# Patient Record
Sex: MALE | Race: WHITE | NOT HISPANIC OR LATINO | Employment: UNEMPLOYED | URBAN - METROPOLITAN AREA
[De-identification: names, ages, dates, MRNs, and addresses within clinical notes are randomized per-mention and may not be internally consistent; named-entity substitution may affect disease eponyms.]

---

## 2019-10-16 ENCOUNTER — OFFICE VISIT (OUTPATIENT)
Dept: FAMILY MEDICINE | Facility: CLINIC | Age: 54
End: 2019-10-16
Payer: MEDICAID

## 2019-10-16 VITALS
RESPIRATION RATE: 20 BRPM | HEIGHT: 71 IN | BODY MASS INDEX: 39.2 KG/M2 | SYSTOLIC BLOOD PRESSURE: 124 MMHG | OXYGEN SATURATION: 97 % | DIASTOLIC BLOOD PRESSURE: 80 MMHG | HEART RATE: 88 BPM | WEIGHT: 280 LBS

## 2019-10-16 DIAGNOSIS — R19.01 ABDOMINAL MASS, RIGHT UPPER QUADRANT: Primary | ICD-10-CM

## 2019-10-16 DIAGNOSIS — F41.9 ANXIETY: ICD-10-CM

## 2019-10-16 DIAGNOSIS — G47.30 SLEEP APNEA, UNSPECIFIED TYPE: ICD-10-CM

## 2019-10-16 DIAGNOSIS — F32.A DEPRESSION, UNSPECIFIED DEPRESSION TYPE: ICD-10-CM

## 2019-10-16 DIAGNOSIS — Z76.89 ENCOUNTER TO ESTABLISH CARE: ICD-10-CM

## 2019-10-16 DIAGNOSIS — F31.9 BIPOLAR 1 DISORDER: ICD-10-CM

## 2019-10-16 DIAGNOSIS — M50.20 SLIPPED DISC IN NECK: ICD-10-CM

## 2019-10-16 DIAGNOSIS — G47.00 INSOMNIA, UNSPECIFIED TYPE: ICD-10-CM

## 2019-10-16 PROCEDURE — 90471 IMMUNIZATION ADMIN: CPT | Mod: S$GLB,,, | Performed by: NURSE PRACTITIONER

## 2019-10-16 PROCEDURE — 90686 PR FLU VACCINE, QIIV4, NO PRSV, 0.5 ML, IM: ICD-10-PCS | Mod: S$GLB,,, | Performed by: NURSE PRACTITIONER

## 2019-10-16 PROCEDURE — 99205 OFFICE O/P NEW HI 60 MIN: CPT | Mod: 25,S$GLB,, | Performed by: NURSE PRACTITIONER

## 2019-10-16 PROCEDURE — 90686 IIV4 VACC NO PRSV 0.5 ML IM: CPT | Mod: S$GLB,,, | Performed by: NURSE PRACTITIONER

## 2019-10-16 PROCEDURE — 99205 PR OFFICE/OUTPT VISIT, NEW, LEVL V, 60-74 MIN: ICD-10-PCS | Mod: 25,S$GLB,, | Performed by: NURSE PRACTITIONER

## 2019-10-16 PROCEDURE — 90471 PR IMMUNIZ ADMIN,1 SINGLE/COMB VAC/TOXOID: ICD-10-PCS | Mod: S$GLB,,, | Performed by: NURSE PRACTITIONER

## 2019-10-16 RX ORDER — DULOXETIN HYDROCHLORIDE 60 MG/1
60 CAPSULE, DELAYED RELEASE ORAL NIGHTLY
COMMUNITY
Start: 2019-10-15 | End: 2020-02-28 | Stop reason: SDUPTHER

## 2019-10-16 RX ORDER — TIZANIDINE 4 MG/1
TABLET ORAL
Refills: 3 | COMMUNITY
Start: 2019-09-17 | End: 2020-09-21

## 2019-10-16 RX ORDER — TAMSULOSIN HYDROCHLORIDE 0.4 MG/1
CAPSULE ORAL
Refills: 3 | COMMUNITY
Start: 2019-10-02 | End: 2022-03-01 | Stop reason: SDUPTHER

## 2019-10-16 RX ORDER — FINASTERIDE 5 MG/1
5 TABLET, FILM COATED ORAL DAILY
Refills: 3 | COMMUNITY
Start: 2019-10-02 | End: 2022-03-01 | Stop reason: SDUPTHER

## 2019-10-16 RX ORDER — CYANOCOBALAMIN (VITAMIN B-12) 500 MCG
TABLET ORAL
COMMUNITY
End: 2022-07-25 | Stop reason: ALTCHOICE

## 2019-10-16 RX ORDER — LATANOPROST 50 UG/ML
SOLUTION/ DROPS OPHTHALMIC
Refills: 2 | COMMUNITY
Start: 2019-10-01 | End: 2020-09-21

## 2019-10-16 RX ORDER — GABAPENTIN 600 MG/1
600 TABLET ORAL 3 TIMES DAILY
Refills: 6 | COMMUNITY
Start: 2019-09-21

## 2019-10-16 RX ORDER — ARIPIPRAZOLE 5 MG/1
TABLET ORAL
COMMUNITY
Start: 2019-08-22 | End: 2019-12-11 | Stop reason: ALTCHOICE

## 2019-10-16 NOTE — PROGRESS NOTES
Clinic Note  10/16/2019      Subjective:       Patient ID:  BEATRIZ is a 54 y.o. male being seen in office today to establish care.       Chief Complaint: Establish Care (Prior PCP Neftaly Stokes) and Abdominal Pain (Upper R quad for a few mnths - has a knot x 8 yrs. States he was taken off of Lovastin 3 mn ths ago d/t something on kidney)    Pt presents to clinic to establish care. Previously seen by Dr. Stokes at [facility] but pt has a hard time with transportation to [place] and would like to be seen locally. Reports mass to right upper abdomen x several months that is non-tender. Denies injury.  States Dr. Stokes took him off of a statin due to elevated liver enzymes in the past. Will request old labs and records.     Hx of Bipolar Disorder, Depression, and Anxiety who are all managed my [facility]. Pt states he is well controlled on current regimen of Abilify and Cymbalta.    Hx of elevated PSA and Prostatitis-managed by , has follow-up in January. Denies  complaints today.    Sleep apnea-Has not been wearing CPAP due to running out of supplies. Request supply refill be sent to [place] [facility] pharmacy/med equipment.    Insomnia-pt states no trouble falling asleep but wakes up periodically all night long. Attributes this to not wearing CPAP in a while    Recent MRI by Dr. Stokes in September reveals slipped disc and multiple spurs of Cervical spine. Reports pain and bilateral tingling to upper extremities. On Neurontin with relief and seeing Dr. Yoder for treatment.    Family History   Problem Relation Age of Onset    Diabetes Mother     Hypertension Mother     Lung cancer Brother      Social History     Socioeconomic History    Marital status: Single     Spouse name: Not on file    Number of children: Not on file    Years of education: Not on file    Highest education level: Not on file   Occupational History    Not on file   Social Needs    Financial resource strain: Not on  file    Food insecurity:     Worry: Not on file     Inability: Not on file    Transportation needs:     Medical: Not on file     Non-medical: Not on file   Tobacco Use    Smoking status: Former Smoker     Packs/day: 1.00     Years: 30.00     Pack years: 30.00     Types: Cigarettes    Smokeless tobacco: Former User     Quit date: 1/1/2016   Substance and Sexual Activity    Alcohol use: Not Currently     Frequency: Never    Drug use: Never    Sexual activity: Not on file   Lifestyle    Physical activity:     Days per week: Not on file     Minutes per session: Not on file    Stress: Not on file   Relationships    Social connections:     Talks on phone: Not on file     Gets together: Not on file     Attends [employer] service: Not on file     Active member of club or organization: Not on file     Attends meetings of clubs or organizations: Not on file     Relationship status: Not on file   Other Topics Concern    Not on file   Social History Narrative    Not on file     Past Surgical History:   Procedure Laterality Date    BACK SURGERY  03/01/2019    UMBILICAL HERNIA REPAIR  11/01/2018    W/ MESH     Social History     Substance and Sexual Activity   Alcohol Use Not Currently    Frequency: Never     Patient has no known allergies.  Medication List with Changes/Refills   Current Medications    ARIPIPRAZOLE (ABILIFY) 5 MG TAB        DULOXETINE (CYMBALTA) 60 MG CAPSULE        FINASTERIDE (PROSCAR) 5 MG TABLET    Take 5 mg by mouth once daily.    GABAPENTIN (NEURONTIN) 600 MG TABLET    Take 600 mg by mouth 3 (three) times daily.    LATANOPROST 0.005 % OPHTHALMIC SOLUTION    INSTILL 1 DROP INTO EACH EYE EVERY NIGHT AS DIRECTED    MELATONIN 1 MG TAB    Take by mouth.    TAMSULOSIN (FLOMAX) 0.4 MG CAP    TAKE 1 CAPSULE BY MOUTH ONCE DAILY 30 MINUTES AFTER A MEAL    TIZANIDINE (ZANAFLEX) 4 MG TABLET    TAKE 1 & 1 2 TABLETS BY MOUTH THREE TIMES DAILY AS NEEDED FOR MUSCLE SPASM       Review of Systems  "  Constitutional: Negative for chills, fever and weight loss.   HENT: Negative for congestion, sinus pain and sore throat.    Eyes: Negative for photophobia.   Respiratory: Negative for cough, shortness of breath and wheezing.    Cardiovascular: Negative for chest pain, palpitations and leg swelling.   Gastrointestinal: Negative for abdominal pain, blood in stool, constipation, diarrhea, heartburn, nausea and vomiting.   Genitourinary: Negative for frequency and urgency.   Musculoskeletal: Positive for neck pain. Negative for falls and joint pain.   Skin: Negative for rash.   Neurological: Positive for tingling. Negative for dizziness, seizures, loss of consciousness, weakness and headaches.   Psychiatric/Behavioral: Negative for depression, memory loss and suicidal ideas.             /80   Pulse 88   Resp 20   Ht 5' 11" (1.803 m)   Wt 127 kg (280 lb)   SpO2 97%   BMI 39.05 kg/m²   Estimated body mass index is 39.05 kg/m² as calculated from the following:    Height as of this encounter: 5' 11" (1.803 m).    Weight as of this encounter: 127 kg (280 lb).    Objective:        Physical Exam   Constitutional: He is oriented to person, place, and time and well-developed, well-nourished, and in no distress.   HENT:   Head: Normocephalic and atraumatic.   Eyes: Pupils are equal, round, and reactive to light. Conjunctivae and EOM are normal.   Neck: Normal range of motion. Neck supple.   Cardiovascular: Normal rate, regular rhythm and intact distal pulses. Exam reveals no gallop and no friction rub.   No murmur heard.  Pulmonary/Chest: Effort normal and breath sounds normal. No respiratory distress. He has no wheezes.   Abdominal: Soft. Bowel sounds are normal. He exhibits mass (RUQ Quadrant). He exhibits no distension. There is no tenderness.   Musculoskeletal: Normal range of motion. He exhibits no edema, tenderness or deformity.   Neurological: He is alert and oriented to person, place, and time. He has normal " reflexes. No cranial nerve deficit. Gait normal.   Skin: Skin is warm and dry. No rash noted. No erythema.   Psychiatric: Mood, memory, affect and judgment normal.   Nursing note and vitals reviewed.        Assessment and Plan:         Dre was seen today for establish care and abdominal pain.    Diagnoses and all orders for this visit:    Abdominal mass, right upper quadrant  -     US Abdomen Limited; Future  -     US Abdomen Limited    Encounter to establish care  -     TSH; Future  -     Comprehensive metabolic panel; Future  -     CBC auto differential; Future  -     Lipid panel; Future  -     TSH  -     Comprehensive metabolic panel  -     CBC auto differential  -     Lipid panel  -     influenza (QUADRIVALENT PF) vaccine 0.5 mL    Sleep apnea, unspecified type  -     CPAP/BIPAP SUPPLIES    Anxiety  Comments:  Continue seeing Psych at [facility] and current RX regimen    Bipolar 1 disorder  Comments:  Continue seeing Psych at [facility] and current RX regimen    Depression, unspecified depression type  Comments:  Continue seeing Psych at [facility] and current RX regimen    Slipped disc in neck  Comments:  Keep follow-up with Dr. Yoder as planned    Insomnia, unspecified type  Comments:  Begin wearing CPAP again once supply refill received    Will request all medical records from [facility]     Follow up:   Follow up in about 3 weeks (around 11/6/2019).            Genevieve Lipscomb NP

## 2019-10-28 ENCOUNTER — TELEPHONE (OUTPATIENT)
Dept: FAMILY MEDICINE | Facility: CLINIC | Age: 54
End: 2019-10-28

## 2019-10-28 NOTE — TELEPHONE ENCOUNTER
[facility] called to advise they spoke with the patient today to ask about his copy of the sleep study. He has it through Dr Stokes at [facility]. He has not used the machine in a while so they will require that he wear it more than 4 hrs q day. He may have to retitrate/do the 2nd half of the sleep study. Or use a donated machine for 90 days. [facility] will call back to advise what will happen.

## 2019-11-06 ENCOUNTER — OFFICE VISIT (OUTPATIENT)
Dept: FAMILY MEDICINE | Facility: CLINIC | Age: 54
End: 2019-11-06
Payer: MEDICAID

## 2019-11-06 VITALS
RESPIRATION RATE: 18 BRPM | WEIGHT: 287 LBS | HEIGHT: 71 IN | HEART RATE: 83 BPM | TEMPERATURE: 97 F | SYSTOLIC BLOOD PRESSURE: 120 MMHG | BODY MASS INDEX: 40.18 KG/M2 | DIASTOLIC BLOOD PRESSURE: 70 MMHG | OXYGEN SATURATION: 98 %

## 2019-11-06 DIAGNOSIS — M50.20 SLIPPED DISC IN NECK: ICD-10-CM

## 2019-11-06 DIAGNOSIS — F32.A DEPRESSION, UNSPECIFIED DEPRESSION TYPE: ICD-10-CM

## 2019-11-06 DIAGNOSIS — Z79.899 LONG TERM CURRENT USE OF THERAPEUTIC DRUG: ICD-10-CM

## 2019-11-06 DIAGNOSIS — G47.00 INSOMNIA, UNSPECIFIED TYPE: ICD-10-CM

## 2019-11-06 DIAGNOSIS — G47.30 SLEEP APNEA, UNSPECIFIED TYPE: ICD-10-CM

## 2019-11-06 DIAGNOSIS — F31.9 BIPOLAR 1 DISORDER: ICD-10-CM

## 2019-11-06 DIAGNOSIS — R19.01 ABDOMINAL MASS, RIGHT UPPER QUADRANT: ICD-10-CM

## 2019-11-06 DIAGNOSIS — F41.9 ANXIETY: ICD-10-CM

## 2019-11-06 DIAGNOSIS — F41.0 PANIC ATTACKS: Primary | ICD-10-CM

## 2019-11-06 DIAGNOSIS — R79.9 ABNORMAL BLOOD CHEMISTRY: ICD-10-CM

## 2019-11-06 PROBLEM — Z76.89 ENCOUNTER TO ESTABLISH CARE: Status: RESOLVED | Noted: 2019-10-16 | Resolved: 2019-11-06

## 2019-11-06 PROCEDURE — 99214 PR OFFICE/OUTPT VISIT, EST, LEVL IV, 30-39 MIN: ICD-10-PCS | Mod: S$GLB,,, | Performed by: NURSE PRACTITIONER

## 2019-11-06 PROCEDURE — 99214 OFFICE O/P EST MOD 30 MIN: CPT | Mod: S$GLB,,, | Performed by: NURSE PRACTITIONER

## 2019-11-06 RX ORDER — DIAZEPAM 5 MG/1
5 TABLET ORAL EVERY 8 HOURS PRN
Qty: 90 TABLET | Refills: 0 | Status: SHIPPED | OUTPATIENT
Start: 2019-11-06 | End: 2019-12-11 | Stop reason: SDUPTHER
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
## 2019-11-15 LAB
ABS NRBC COUNT: 0 X 10 3/UL (ref 0–0.01)
ABSOLUTE BASOPHIL: 0.03 X 10 3/UL (ref 0–0.22)
ABSOLUTE EOSINOPHIL: 0.51 X 10 3/UL (ref 0.04–0.54)
ABSOLUTE IMMATURE GRAN: 0.03 X 10 3/UL (ref 0–0.04)
ABSOLUTE LYMPHOCYTE: 1.07 X 10 3/UL (ref 0.86–4.75)
ABSOLUTE MONOCYTE: 0.73 X 10 3/UL (ref 0.22–1.08)
ALBUMIN SERPL-MCNC: 5.1 G/DL (ref 3.5–5.2)
ALBUMIN/GLOB SERPL ELPH: 1.8 {RATIO} (ref 1–2.7)
ALP ISOS SERPL LEV INH-CCNC: 76 U/L (ref 40–130)
ALT (SGPT): 32 U/L (ref 0–41)
ANION GAP SERPL CALC-SCNC: 9 MMOL/L (ref 8–17)
AST SERPL-CCNC: 18 U/L (ref 0–40)
BASOPHILS NFR BLD: 0.5 % (ref 0.2–1.2)
BILIRUBIN, TOTAL: 0.43 MG/DL (ref 0–1.2)
BUN/CREAT SERPL: 10.3 (ref 6–20)
CALCIUM SERPL-MCNC: 10.2 MG/DL (ref 8.6–10.2)
CARBON DIOXIDE, CO2: 32 MMOL/L (ref 22–29)
CHLORIDE: 102 MMOL/L (ref 98–107)
CHOLEST SERPL-MSCNC: 154 MG/DL (ref 100–200)
CREAT SERPL-MCNC: 1.08 MG/DL (ref 0.7–1.2)
EOSINOPHIL NFR BLD: 8.6 % (ref 0.7–7)
GFR ESTIMATION: 71.25
GLOBULIN: 2.9 G/DL (ref 1.5–4.5)
GLUCOSE: 128 MG/DL (ref 74–106)
HCT VFR BLD AUTO: 44.4 % (ref 42–52)
HDLC SERPL-MCNC: 30 MG/DL
HGB BLD-MCNC: 14.5 G/DL (ref 14–18)
IMMATURE GRANULOCYTES: 0.5 % (ref 0–0.5)
LDL/HDL RATIO: 3.6 (ref 1–3)
LDLC SERPL CALC-MCNC: 107.6 MG/DL (ref 0–100)
LYMPHOCYTES NFR BLD: 18.1 % (ref 19.3–53.1)
MCH RBC QN AUTO: 31 PG (ref 27–32)
MCHC RBC AUTO-ENTMCNC: 32.7 G/DL (ref 32–36)
MCV RBC AUTO: 95.1 FL (ref 80–94)
MONOCYTES NFR BLD: 12.3 % (ref 4.7–12.5)
NEUTROPHILS ABSOLUTE COUNT: 3.55 X 10 3/UL (ref 2.15–7.56)
NEUTROPHILS NFR BLD: 60 % (ref 34–71.1)
NUCLEATED RED BLOOD CELLS: 0 /100 WBC (ref 0–0.2)
PLATELET # BLD AUTO: 245 X 10 3/UL (ref 135–400)
POTASSIUM: 4.8 MMOL/L (ref 3.5–5.1)
PROT SNV-MCNC: 8 G/DL (ref 6.4–8.3)
RBC # BLD AUTO: 4.67 X 10 6/UL (ref 4.7–6.1)
RDW-SD: 42.7 FL (ref 37–54)
SODIUM: 143 MMOL/L (ref 136–145)
TRIGL SERPL-MCNC: 82 MG/DL (ref 0–150)
TSH SERPL DL<=0.005 MIU/L-ACNC: 3.02 UIU/ML (ref 0.27–4.2)
UREA NITROGEN (BUN): 11.1 MG/DL (ref 6–20)
WBC # BLD: 5.92 X 10 3/UL (ref 4.3–10.8)

## 2019-12-11 ENCOUNTER — OFFICE VISIT (OUTPATIENT)
Dept: FAMILY MEDICINE | Facility: CLINIC | Age: 54
End: 2019-12-11
Payer: MEDICAID

## 2019-12-11 VITALS
WEIGHT: 288.5 LBS | BODY MASS INDEX: 40.24 KG/M2 | HEART RATE: 65 BPM | DIASTOLIC BLOOD PRESSURE: 60 MMHG | OXYGEN SATURATION: 97 % | SYSTOLIC BLOOD PRESSURE: 110 MMHG

## 2019-12-11 DIAGNOSIS — Z79.899 LONG TERM CURRENT USE OF THERAPEUTIC DRUG: ICD-10-CM

## 2019-12-11 DIAGNOSIS — R73.01 ELEVATED FASTING GLUCOSE: ICD-10-CM

## 2019-12-11 DIAGNOSIS — N52.9 ERECTILE DYSFUNCTION, UNSPECIFIED ERECTILE DYSFUNCTION TYPE: Primary | ICD-10-CM

## 2019-12-11 DIAGNOSIS — E78.6 LOW HDL (UNDER 40): ICD-10-CM

## 2019-12-11 DIAGNOSIS — F41.9 ANXIETY: ICD-10-CM

## 2019-12-11 DIAGNOSIS — G47.00 INSOMNIA, UNSPECIFIED TYPE: ICD-10-CM

## 2019-12-11 DIAGNOSIS — F41.0 PANIC ATTACKS: ICD-10-CM

## 2019-12-11 PROCEDURE — 99214 OFFICE O/P EST MOD 30 MIN: CPT | Mod: S$GLB,,, | Performed by: NURSE PRACTITIONER

## 2019-12-11 PROCEDURE — 99214 PR OFFICE/OUTPT VISIT, EST, LEVL IV, 30-39 MIN: ICD-10-PCS | Mod: S$GLB,,, | Performed by: NURSE PRACTITIONER

## 2019-12-11 RX ORDER — DULOXETIN HYDROCHLORIDE 30 MG/1
30 CAPSULE, DELAYED RELEASE ORAL DAILY
COMMUNITY
End: 2020-02-28 | Stop reason: SDUPTHER

## 2019-12-11 RX ORDER — ESCITALOPRAM OXALATE 20 MG/1
20 TABLET ORAL NIGHTLY
COMMUNITY
End: 2020-09-21

## 2019-12-11 RX ORDER — TADALAFIL 5 MG/1
5 TABLET ORAL DAILY PRN
Qty: 5 TABLET | Refills: 0 | Status: SHIPPED | OUTPATIENT
Start: 2019-12-11 | End: 2020-01-30

## 2019-12-11 RX ORDER — DIAZEPAM 5 MG/1
5 TABLET ORAL EVERY 8 HOURS PRN
Qty: 90 TABLET | Refills: 0 | Status: SHIPPED | OUTPATIENT
Start: 2019-12-11 | End: 2020-01-09 | Stop reason: SDUPTHER

## 2019-12-11 RX ORDER — DIPHENHYDRAMINE HCL 50 MG
50 CAPSULE ORAL NIGHTLY PRN
COMMUNITY
End: 2022-02-14

## 2019-12-11 NOTE — PROGRESS NOTES
Clinic Note  12/11/2019      Subjective:       Patient ID:  BEATRIZ is a 54 y.o. male being seen in office today to establish care.       Chief Complaint: Follow-up    Mr. Fowler is here for follow-up regarding panic attacks, anxiety, and to review recent lab work. He was prescribed PRN Valium at his last visit for panic attacks. He states that the Valium has worked well for him and he hasn't had any attacks since initiating.     ED-He reports erectile dysfunction for a few years. Would like to begin dating again and inquiring about medication. Followed by Dr. Brar, urology for hx of elevated PSA. He has a follow-up with her in January. No other  complaints today.     All recent lab work reviewed with patient. Fasting glucose elevated at 128. Patient is unsure if A1C ever checked. HDL-30. Patient states he has been using a stationary bike for exercise and trying intermittent fasting to control his diet.     Bipolar/Depression/Anxiety-treated by [facility]. Recently switched from Abilify to Cymbalta twice daily and has had no issues with the change.     Neck/Back-Recent follow-up with Dr. Yoder. States he will repeat imaging at first of year. Currently on Cymbalta and Gabapentin with some relief.          Family History   Problem Relation Age of Onset    Diabetes Mother     Hypertension Mother     Lung cancer Brother      Social History     Socioeconomic History    Marital status: Single     Spouse name: Not on file    Number of children: Not on file    Years of education: Not on file    Highest education level: Not on file   Occupational History    Not on file   Social Needs    Financial resource strain: Not on file    Food insecurity:     Worry: Not on file     Inability: Not on file    Transportation needs:     Medical: Not on file     Non-medical: Not on file   Tobacco Use    Smoking status: Former Smoker     Packs/day: 1.00     Years: 30.00     Pack years: 30.00     Types: Cigarettes     Smokeless tobacco: Former User     Quit date: 1/1/2016   Substance and Sexual Activity    Alcohol use: Not Currently     Frequency: Never    Drug use: Never    Sexual activity: Not on file   Lifestyle    Physical activity:     Days per week: Not on file     Minutes per session: Not on file    Stress: Not on file   Relationships    Social connections:     Talks on phone: Not on file     Gets together: Not on file     Attends Religion service: Not on file     Active member of club or organization: Not on file     Attends meetings of clubs or organizations: Not on file     Relationship status: Not on file   Other Topics Concern    Not on file   Social History Narrative    Not on file     Past Surgical History:   Procedure Laterality Date    BACK SURGERY  03/01/2019    UMBILICAL HERNIA REPAIR  11/01/2018    W/ [facility]     Social History     Substance and Sexual Activity   Alcohol Use Not Currently    Frequency: Never     Patient has no known allergies.  Medication List with Changes/Refills   New Medications    TADALAFIL (CIALIS) 5 MG TABLET    Take 1 tablet (5 mg total) by mouth daily as needed for Erectile Dysfunction.   Current Medications    DIPHENHYDRAMINE (BENADRYL) 50 MG CAPSULE    Take 50 mg by mouth nightly as needed for Itching.    DULOXETINE (CYMBALTA) 30 MG CAPSULE    Take 30 mg by mouth once daily.    DULOXETINE (CYMBALTA) 60 MG CAPSULE    Take 60 mg by mouth every evening.     ESCITALOPRAM OXALATE (LEXAPRO) 20 MG TABLET    Take 20 mg by mouth every evening.    FINASTERIDE (PROSCAR) 5 MG TABLET    Take 5 mg by mouth once daily.    GABAPENTIN (NEURONTIN) 600 MG TABLET    Take 600 mg by mouth 3 (three) times daily.    LATANOPROST 0.005 % OPHTHALMIC SOLUTION    INSTILL 1 DROP INTO EACH EYE EVERY NIGHT AS DIRECTED    MELATONIN 1 MG TAB    Take by mouth.    TAMSULOSIN (FLOMAX) 0.4 MG CAP    TAKE 1 CAPSULE BY MOUTH ONCE DAILY 30 MINUTES AFTER A MEAL    TIZANIDINE (ZANAFLEX) 4 MG TABLET    TAKE 1 & 1 2  "TABLETS BY MOUTH THREE TIMES DAILY AS NEEDED FOR MUSCLE SPASM   Changed and/or Refilled Medications    Modified Medication Previous Medication    DIAZEPAM (VALIUM) 5 MG TABLET diazePAM (VALIUM) 5 MG tablet       Take 1 tablet (5 mg total) by mouth every 8 (eight) hours as needed for Anxiety or Insomnia.    Take 1 tablet (5 mg total) by mouth every 8 (eight) hours as needed for Anxiety or Insomnia.   Discontinued Medications    ARIPIPRAZOLE (ABILIFY) 5 MG TAB           Review of Systems   Constitutional: Negative for chills, fever and weight loss.   HENT: Negative for congestion, sinus pain and sore throat.    Eyes: Negative for photophobia.   Respiratory: Negative for cough, shortness of breath and wheezing.    Cardiovascular: Negative for chest pain, palpitations and leg swelling.   Gastrointestinal: Negative for abdominal pain, blood in stool, constipation, diarrhea, heartburn, nausea and vomiting.   Genitourinary: Negative for frequency and urgency.        Reports erectile dysfunction   Musculoskeletal: Positive for back pain and neck pain. Negative for falls and joint pain.   Skin: Negative for rash.   Neurological: Positive for tingling. Negative for dizziness, seizures, loss of consciousness, weakness and headaches.   Psychiatric/Behavioral: Negative for depression, memory loss and suicidal ideas. The patient is not nervous/anxious.              /60 (BP Location: Left arm, Patient Position: Sitting, BP Method: Large (Manual))   Pulse 65   Wt 130.9 kg (288 lb 8 oz)   SpO2 97%   BMI 40.24 kg/m²   Estimated body mass index is 40.24 kg/m² as calculated from the following:    Height as of 11/6/19: 5' 11" (1.803 m).    Weight as of this encounter: 130.9 kg (288 lb 8 oz).    Objective:        Physical Exam   Constitutional: He is oriented to person, place, and time and well-developed, well-nourished, and in no distress.   HENT:   Head: Normocephalic and atraumatic.   Eyes: Pupils are equal, round, and " reactive to light. Conjunctivae and EOM are normal.   Neck: Normal range of motion. Neck supple. No JVD present.   Cardiovascular: Normal rate, regular rhythm and intact distal pulses. Exam reveals no gallop and no friction rub.   No murmur heard.  Pulmonary/Chest: Effort normal and breath sounds normal. No respiratory distress. He has no wheezes.   Abdominal: Soft. Bowel sounds are normal. He exhibits mass (RUQ (lipoma)). He exhibits no distension. There is no tenderness.   Musculoskeletal: Normal range of motion. He exhibits no edema, tenderness or deformity.   Neurological: He is alert and oriented to person, place, and time. No cranial nerve deficit. Gait normal.   Skin: Skin is warm and dry. No rash noted. No erythema.   Psychiatric: Mood, memory, affect and judgment normal.   Nursing note and vitals reviewed.        Assessment and Plan:         Dre was seen today for follow-up.    Diagnoses and all orders for this visit:    Erectile dysfunction, unspecified erectile dysfunction type  -     tadalafil (CIALIS) 5 MG tablet; Take 1 tablet (5 mg total) by mouth daily as needed for Erectile Dysfunction.    Panic attacks  -     diazePAM (VALIUM) 5 MG tablet; Take 1 tablet (5 mg total) by mouth every 8 (eight) hours as needed for Anxiety or Insomnia.    Insomnia, unspecified type  -     diazePAM (VALIUM) 5 MG tablet; Take 1 tablet (5 mg total) by mouth every 8 (eight) hours as needed for Anxiety or Insomnia.    Anxiety  -     diazePAM (VALIUM) 5 MG tablet; Take 1 tablet (5 mg total) by mouth every 8 (eight) hours as needed for Anxiety or Insomnia.    Long term current use of therapeutic drug  -     Basic metabolic panel; Future  -     Basic metabolic panel    Low HDL (under 40)  -     Lipid panel; Future  -     Lipid panel    Elevated fasting glucose  -     Hemoglobin A1c; Future  -     Basic metabolic panel; Future  -     Hemoglobin A1c  -     Basic metabolic panel    Patient is limited on transportation. Will  have labs to recheck fasting glucose, A1C, lipids done before next follow-up appointment in 3 months. Trial of Cialis for ED; encouraged patient to discuss with Dr. Brar at next appointment.   Continue exercise regimen.  Follow a low-fat/low-cholesterol diet  Increase fiber with oatmeal, bran, or fiber supplements  Increase daily intake of fruits and vegetables.        Follow up:   Follow up in about 3 months (around 3/11/2020), or sooner if needed.            Genevieve Lipscomb, NP

## 2020-01-09 DIAGNOSIS — F41.0 PANIC ATTACKS: ICD-10-CM

## 2020-01-09 DIAGNOSIS — F41.9 ANXIETY: ICD-10-CM

## 2020-01-09 DIAGNOSIS — G47.00 INSOMNIA, UNSPECIFIED TYPE: ICD-10-CM

## 2020-01-09 RX ORDER — DIAZEPAM 5 MG/1
5 TABLET ORAL EVERY 8 HOURS PRN
Qty: 90 TABLET | Refills: 0 | Status: SHIPPED | OUTPATIENT
Start: 2020-01-09 | End: 2020-02-07 | Stop reason: SDUPTHER

## 2020-01-20 ENCOUNTER — OFFICE VISIT (OUTPATIENT)
Dept: FAMILY MEDICINE | Facility: CLINIC | Age: 55
End: 2020-01-20
Payer: MEDICAID

## 2020-01-20 VITALS
DIASTOLIC BLOOD PRESSURE: 88 MMHG | HEART RATE: 88 BPM | BODY MASS INDEX: 39.89 KG/M2 | OXYGEN SATURATION: 98 % | SYSTOLIC BLOOD PRESSURE: 130 MMHG | WEIGHT: 286 LBS

## 2020-01-20 DIAGNOSIS — M50.20 SLIPPED DISC IN NECK: Primary | ICD-10-CM

## 2020-01-20 PROCEDURE — 99212 PR OFFICE/OUTPT VISIT, EST, LEVL II, 10-19 MIN: ICD-10-PCS | Mod: S$GLB,,, | Performed by: NURSE PRACTITIONER

## 2020-01-20 PROCEDURE — 99212 OFFICE O/P EST SF 10 MIN: CPT | Mod: S$GLB,,, | Performed by: NURSE PRACTITIONER

## 2020-01-20 NOTE — PROGRESS NOTES
Clinic Note  1/20/2020      Subjective:       Patient ID:  BEATRIZ is a 54 y.o. male being seen in office today as an established patient.     Chief Complaint: Referral    Mr. Erickson is here today requesting a referral to Dr. Hoffman, Neurosurgery, for a 2nd opinion for his neck and back problems.  MRI from September 2019 reveals slipped disc and multiple spurs of Cervical spine. He had previously been seen and treated by Dr. Yoder. He had repeat imaging by Dr. Yoder on 1/15/2020 and was told there was not much more he could do for him at this time. He reports neck and back pain that radiates to the buttocks and down his legs. He states the Gabapentin does very little to help. He has the imaging for review on his phone but we will request the records from Dr. Yoder's office.       Family History   Problem Relation Age of Onset    Diabetes Mother     Hypertension Mother     Lung cancer Brother      Past Surgical History:   Procedure Laterality Date    BACK SURGERY  03/01/2019    UMBILICAL HERNIA REPAIR  11/01/2018    W/ [facility]     Social History     Substance and Sexual Activity   Alcohol Use Not Currently    Frequency: Never     Patient has no known allergies.  Medication List with Changes/Refills   Current Medications    DIAZEPAM (VALIUM) 5 MG TABLET    Take 1 tablet (5 mg total) by mouth every 8 (eight) hours as needed for Anxiety or Insomnia.    DIPHENHYDRAMINE (BENADRYL) 50 MG CAPSULE    Take 50 mg by mouth nightly as needed for Itching.    DULOXETINE (CYMBALTA) 30 MG CAPSULE    Take 30 mg by mouth once daily.    DULOXETINE (CYMBALTA) 60 MG CAPSULE    Take 60 mg by mouth every evening.     ESCITALOPRAM OXALATE (LEXAPRO) 20 MG TABLET    Take 20 mg by mouth every evening.    FINASTERIDE (PROSCAR) 5 MG TABLET    Take 5 mg by mouth once daily.    GABAPENTIN (NEURONTIN) 600 MG TABLET    Take 600 mg by mouth 3 (three) times daily.    LATANOPROST 0.005 % OPHTHALMIC SOLUTION    INSTILL 1 DROP INTO EACH EYE  "EVERY NIGHT AS DIRECTED    MELATONIN 1 MG TAB    Take by mouth.    TADALAFIL (CIALIS) 5 MG TABLET    Take 1 tablet (5 mg total) by mouth daily as needed for Erectile Dysfunction.    TAMSULOSIN (FLOMAX) 0.4 MG CAP    TAKE 1 CAPSULE BY MOUTH ONCE DAILY 30 MINUTES AFTER A MEAL    TIZANIDINE (ZANAFLEX) 4 MG TABLET    TAKE 1 & 1 2 TABLETS BY MOUTH THREE TIMES DAILY AS NEEDED FOR MUSCLE SPASM       Review of Systems   Constitutional: Negative for chills, fever and weight loss.   HENT: Negative for congestion, sinus pain and sore throat.    Eyes: Negative for photophobia.   Respiratory: Negative for cough, shortness of breath and wheezing.    Cardiovascular: Negative for chest pain, palpitations and leg swelling.   Gastrointestinal: Negative for abdominal pain, blood in stool, constipation, diarrhea, heartburn, nausea and vomiting.   Genitourinary: Negative for frequency and urgency.   Musculoskeletal: Positive for back pain and neck pain. Negative for falls, joint pain and myalgias.   Skin: Negative for rash.   Neurological: Positive for tingling and sensory change. Negative for dizziness, seizures, loss of consciousness, weakness and headaches.   Psychiatric/Behavioral: Negative for depression, memory loss and suicidal ideas.             /88 (BP Location: Left arm, Patient Position: Sitting, BP Method: Large (Manual))   Pulse 88   Wt 129.7 kg (286 lb)   SpO2 98%   BMI 39.89 kg/m²   Estimated body mass index is 39.89 kg/m² as calculated from the following:    Height as of 11/6/19: 5' 11" (1.803 m).    Weight as of this encounter: 129.7 kg (286 lb).    Objective:        Physical Exam   Constitutional: He is oriented to person, place, and time and well-developed, well-nourished, and in no distress.   HENT:   Head: Normocephalic and atraumatic.   Eyes: Pupils are equal, round, and reactive to light. Conjunctivae and EOM are normal.   Neck: Trachea normal and normal range of motion. Neck supple. No JVD present. "   Cardiovascular: Normal rate, regular rhythm, intact distal pulses and normal pulses. Exam reveals no gallop and no friction rub.   No murmur heard.  Pulmonary/Chest: Effort normal and breath sounds normal. No respiratory distress. He has no wheezes. He has no rhonchi. He has no rales.   Abdominal: Soft. Bowel sounds are normal. He exhibits no distension. There is no tenderness. There is no CVA tenderness.   Musculoskeletal: Normal range of motion. He exhibits no edema, tenderness or deformity.        Lumbar back: He exhibits pain. He exhibits normal range of motion, no tenderness, no bony tenderness, no swelling, no edema and no deformity.   Lymphadenopathy:     He has no cervical adenopathy.   Neurological: He is alert and oriented to person, place, and time. He has normal motor skills and intact cranial nerves. No cranial nerve deficit. Gait normal.   Skin: Skin is warm and dry. No rash noted. No erythema.   Psychiatric: Affect and judgment normal.   Nursing note and vitals reviewed.        Assessment and Plan:         Dre was seen today for referral.    Diagnoses and all orders for this visit:    Slipped disc in neck  Comments:  Will request recent imaging from Dr. Yoder so referral can be sent to [facility] for 2nd opinion.           Follow up:   Follow up as previously scheduled.            Genevieve Lipscomb NP

## 2020-01-30 ENCOUNTER — TELEPHONE (OUTPATIENT)
Dept: FAMILY MEDICINE | Facility: CLINIC | Age: 55
End: 2020-01-30

## 2020-01-30 DIAGNOSIS — N52.9 ERECTILE DYSFUNCTION, UNSPECIFIED ERECTILE DYSFUNCTION TYPE: Primary | ICD-10-CM

## 2020-01-30 RX ORDER — TADALAFIL 10 MG/1
10 TABLET ORAL DAILY PRN
Qty: 7 TABLET | Refills: 0 | Status: SHIPPED | OUTPATIENT
Start: 2020-01-30 | End: 2020-06-01

## 2020-01-30 NOTE — TELEPHONE ENCOUNTER
Pt would like for you to call out the prescription for the generic cialsis but would like to increase it to 10mg since the 5 mg did not work. Please advise.

## 2020-02-07 DIAGNOSIS — F41.0 PANIC ATTACKS: ICD-10-CM

## 2020-02-07 DIAGNOSIS — F41.9 ANXIETY: Primary | ICD-10-CM

## 2020-02-07 DIAGNOSIS — G47.00 INSOMNIA, UNSPECIFIED TYPE: ICD-10-CM

## 2020-02-07 RX ORDER — DIAZEPAM 5 MG/1
5 TABLET ORAL EVERY 8 HOURS PRN
Qty: 90 TABLET | Refills: 0 | Status: SHIPPED | OUTPATIENT
Start: 2020-02-07 | End: 2020-03-04

## 2020-02-28 ENCOUNTER — OFFICE VISIT (OUTPATIENT)
Dept: FAMILY MEDICINE | Facility: CLINIC | Age: 55
End: 2020-02-28
Payer: MEDICAID

## 2020-02-28 VITALS
WEIGHT: 257.38 LBS | HEIGHT: 71 IN | BODY MASS INDEX: 36.03 KG/M2 | SYSTOLIC BLOOD PRESSURE: 120 MMHG | OXYGEN SATURATION: 99 % | DIASTOLIC BLOOD PRESSURE: 80 MMHG | HEART RATE: 82 BPM

## 2020-02-28 DIAGNOSIS — F31.9 BIPOLAR 1 DISORDER: Primary | ICD-10-CM

## 2020-02-28 DIAGNOSIS — F41.9 ANXIETY: ICD-10-CM

## 2020-02-28 DIAGNOSIS — M50.20 SLIPPED DISC IN NECK: ICD-10-CM

## 2020-02-28 DIAGNOSIS — F41.0 PANIC ATTACKS: ICD-10-CM

## 2020-02-28 DIAGNOSIS — F32.A DEPRESSION, UNSPECIFIED DEPRESSION TYPE: ICD-10-CM

## 2020-02-28 PROCEDURE — 99213 OFFICE O/P EST LOW 20 MIN: CPT | Mod: S$GLB,,, | Performed by: NURSE PRACTITIONER

## 2020-02-28 PROCEDURE — 99213 PR OFFICE/OUTPT VISIT, EST, LEVL III, 20-29 MIN: ICD-10-PCS | Mod: S$GLB,,, | Performed by: NURSE PRACTITIONER

## 2020-02-28 RX ORDER — DULOXETIN HYDROCHLORIDE 60 MG/1
60 CAPSULE, DELAYED RELEASE ORAL NIGHTLY
Qty: 30 CAPSULE | Refills: 0 | Status: SHIPPED | OUTPATIENT
Start: 2020-02-28 | End: 2023-06-01

## 2020-02-28 RX ORDER — ARIPIPRAZOLE 5 MG/1
TABLET ORAL
Qty: 30 TABLET | Refills: 0 | Status: SHIPPED | OUTPATIENT
Start: 2020-02-28 | End: 2022-07-25 | Stop reason: ALTCHOICE

## 2020-02-28 RX ORDER — DULOXETIN HYDROCHLORIDE 30 MG/1
30 CAPSULE, DELAYED RELEASE ORAL DAILY
Qty: 30 CAPSULE | Refills: 0 | Status: SHIPPED | OUTPATIENT
Start: 2020-02-28 | End: 2020-03-29

## 2020-02-28 NOTE — PROGRESS NOTES
Clinic Note  2/28/2020      Subjective:       Patient ID:  BEATRIZ is a 54 y.o. male being seen in office today as an established patient.     Chief Complaint: Follow-up    Mr. Fowler is here today for his 3 month follow-up. He has a hx of bipolar depression, anxiety, insomnia, panic attacks, and chronic back/neck pain. At his last encounter he requested a 2nd opinion for his neck and back problems. MRI from September 2019 reveals slipped disc and multiple spurs of Cervical spine. He had previously been seen and treated by Dr. Yoder. He had repeat imaging by Dr. Yoder on 1/15/2020 and was told there was not much more he could do for him at this time. He reports neck and back pain that radiates to the buttocks and down his legs. He states the Gabapentin does very little to help. He requested referral to Dr. Hoffman. Pt signed medical release to receive all previous imaging from [facility] but records were never sent. Pt states he has called their office as well and actually has an EMG scheduled with [facility] coming up to evaluate nerves in his neck and back. He will request records again from his office so referral can be sent to [facility].     Bipolar Depression-pt uses medical transportation and cannot get a ride to his mental health provider for another week. He is currently out of his Cymbalta and Abilify. Denies side effects from not taking or SI/HI.    Panic attacks-currently well controlled on Valium. He is not taking everyday. States he attributes his attacks to stress related factors and is learning to control them as best he can. He will discuss with mental health provider at next visit.    Since his visit on 1/20/20 he has lost a total of 29lbs. He has been doing keto diet and counting calories. He reports improved mood, improved mobility, and better control of back pain and spasms since weight loss.     Family History   Problem Relation Age of Onset    Diabetes Mother     Hypertension Mother     Lung  cancer Brother      Past Surgical History:   Procedure Laterality Date    BACK SURGERY  03/01/2019    UMBILICAL HERNIA REPAIR  11/01/2018    W/ [facility]     Social History     Substance and Sexual Activity   Alcohol Use Not Currently    Frequency: Never     Patient has no known allergies.  Medication List with Changes/Refills   New Medications    ARIPIPRAZOLE (ABILIFY) 5 MG TAB    1/2 tab by mouth twice daily   Current Medications    DIAZEPAM (VALIUM) 5 MG TABLET    Take 1 tablet (5 mg total) by mouth every 8 (eight) hours as needed for Anxiety or Insomnia.    DIPHENHYDRAMINE (BENADRYL) 50 MG CAPSULE    Take 50 mg by mouth nightly as needed for Itching.    ESCITALOPRAM OXALATE (LEXAPRO) 20 MG TABLET    Take 20 mg by mouth every evening.    FINASTERIDE (PROSCAR) 5 MG TABLET    Take 5 mg by mouth once daily.    GABAPENTIN (NEURONTIN) 600 MG TABLET    Take 600 mg by mouth 3 (three) times daily.    LATANOPROST 0.005 % OPHTHALMIC SOLUTION    INSTILL 1 DROP INTO EACH EYE EVERY NIGHT AS DIRECTED    MELATONIN 1 MG TAB    Take by mouth.    TADALAFIL (CIALIS) 10 MG TABLET    Take 1 tablet (10 mg total) by mouth daily as needed for Erectile Dysfunction.    TAMSULOSIN (FLOMAX) 0.4 MG CAP    TAKE 1 CAPSULE BY MOUTH ONCE DAILY 30 MINUTES AFTER A MEAL    TIZANIDINE (ZANAFLEX) 4 MG TABLET    TAKE 1 & 1 2 TABLETS BY MOUTH THREE TIMES DAILY AS NEEDED FOR MUSCLE SPASM   Changed and/or Refilled Medications    Modified Medication Previous Medication    DULOXETINE (CYMBALTA) 30 MG CAPSULE DULoxetine (CYMBALTA) 30 MG capsule       Take 1 capsule (30 mg total) by mouth once daily.    Take 30 mg by mouth once daily.    DULOXETINE (CYMBALTA) 60 MG CAPSULE DULoxetine (CYMBALTA) 60 MG capsule       Take 1 capsule (60 mg total) by mouth every evening.    Take 60 mg by mouth every evening.        Review of Systems   Constitutional: Negative for appetite change, diaphoresis, fatigue and fever.   HENT: Negative for congestion, ear pain, postnasal  "drip, rhinorrhea, sinus pressure, sinus pain and sore throat.    Respiratory: Negative for cough, shortness of breath, wheezing and stridor.    Cardiovascular: Negative for chest pain, palpitations and leg swelling.   Gastrointestinal: Negative for abdominal pain, blood in stool, constipation, diarrhea, nausea and vomiting.   Genitourinary: Negative for flank pain, frequency, hematuria and urgency.   Musculoskeletal: Positive for back pain and neck pain. Negative for gait problem, joint swelling and myalgias.   Skin: Negative for color change and rash.   Neurological: Negative for dizziness, tremors, seizures, speech difficulty, weakness, numbness and headaches.        Tingling and sensory change   Psychiatric/Behavioral: Negative for confusion, decreased concentration, hallucinations and sleep disturbance. The patient is not nervous/anxious.               /80 (BP Location: Left arm, Patient Position: Sitting, BP Method: Large (Manual))   Pulse 82   Ht 5' 11" (1.803 m)   Wt 116.8 kg (257 lb 6.4 oz)   SpO2 99%   BMI 35.90 kg/m²   Estimated body mass index is 35.9 kg/m² as calculated from the following:    Height as of this encounter: 5' 11" (1.803 m).    Weight as of this encounter: 116.8 kg (257 lb 6.4 oz).    Objective:        Physical Exam   Constitutional: He is oriented to person, place, and time. He appears well-developed and well-nourished.   HENT:   Head: Normocephalic and atraumatic.   Right Ear: Tympanic membrane normal.   Left Ear: Tympanic membrane normal.   Nose: Nose normal.   Mouth/Throat: Uvula is midline, oropharynx is clear and moist and mucous membranes are normal. No oropharyngeal exudate, posterior oropharyngeal edema or posterior oropharyngeal erythema.   Eyes: Pupils are equal, round, and reactive to light. Conjunctivae and EOM are normal.   Neck: Trachea normal, normal range of motion and full passive range of motion without pain. Neck supple. No JVD present. Carotid bruit is not " present. No thyroid mass and no thyromegaly present.   Cardiovascular: Normal rate, regular rhythm and intact distal pulses. Exam reveals no gallop and no friction rub.   No murmur heard.  Pulmonary/Chest: Effort normal and breath sounds normal. No stridor. No respiratory distress. He has no wheezes. He has no rhonchi. He has no rales.   Abdominal: Soft. Bowel sounds are normal. He exhibits no distension, no abdominal bruit and no mass. There is no tenderness. There is no CVA tenderness.   Musculoskeletal: Normal range of motion.        Lumbar back: He exhibits pain. He exhibits normal range of motion, no tenderness, no bony tenderness, no swelling, no edema, no deformity and no spasm.   Lymphadenopathy:     He has no cervical adenopathy.   Neurological: He is alert and oriented to person, place, and time. He has normal strength. No cranial nerve deficit or sensory deficit.   Skin: Skin is warm, dry and intact. Capillary refill takes less than 2 seconds. No rash noted.   Psychiatric: He has a normal mood and affect. His speech is normal and behavior is normal. Judgment and thought content normal. His mood appears not anxious. Cognition and memory are normal. He does not exhibit a depressed mood. He expresses no suicidal ideation. He expresses no suicidal plans and no homicidal plans.   Nursing note and vitals reviewed.         Assessment and Plan:         Dre was seen today for follow-up.    Diagnoses and all orders for this visit:    Bipolar 1 disorder  -     DULoxetine (CYMBALTA) 30 MG capsule; Take 1 capsule (30 mg total) by mouth once daily.  -     DULoxetine (CYMBALTA) 60 MG capsule; Take 1 capsule (60 mg total) by mouth every evening.  -     ARIPiprazole (ABILIFY) 5 MG Tab; 1/2 tab by mouth twice daily    Depression, unspecified depression type  -     DULoxetine (CYMBALTA) 30 MG capsule; Take 1 capsule (30 mg total) by mouth once daily.  -     DULoxetine (CYMBALTA) 60 MG capsule; Take 1 capsule (60 mg  total) by mouth every evening.  -     ARIPiprazole (ABILIFY) 5 MG Tab; 1/2 tab by mouth twice daily    Slipped disc in neck  Comments:  Keep EMG appt with Dr. Yoder. Obtain medical records for 2nd opinion with Dr. Hoffman    Anxiety  Comments:  Continue Abilify/Cymbalta    Panic attacks  Comments:  Valium PRN. Discuss at next appt with mental health provider    Refilled 1 month of Abilify and Cymbalta until pt can see mental health provider so he does not continue to go without. Continue with weight loss and diet regimen.   Patient verbalized understanding and agreed to treatment and plan of care.      Follow up:   Follow up in about 3 months (around 5/28/2020), or sooner if needed.            Genevieve Lipscomb NP

## 2020-03-04 DIAGNOSIS — F41.0 PANIC ATTACKS: ICD-10-CM

## 2020-03-04 DIAGNOSIS — F41.9 ANXIETY: ICD-10-CM

## 2020-03-04 DIAGNOSIS — G47.00 INSOMNIA, UNSPECIFIED TYPE: ICD-10-CM

## 2020-03-04 RX ORDER — DIAZEPAM 5 MG/1
5 TABLET ORAL EVERY 8 HOURS PRN
Qty: 90 TABLET | Refills: 0 | Status: SHIPPED | OUTPATIENT
Start: 2020-03-06 | End: 2020-04-03

## 2020-03-19 DIAGNOSIS — F41.0 PANIC ATTACKS: ICD-10-CM

## 2020-03-19 DIAGNOSIS — F41.9 ANXIETY: ICD-10-CM

## 2020-03-19 DIAGNOSIS — G47.00 INSOMNIA, UNSPECIFIED TYPE: ICD-10-CM

## 2020-03-19 RX ORDER — DIAZEPAM 5 MG/1
TABLET ORAL
Refills: 0 | OUTPATIENT
Start: 2020-03-19

## 2020-04-03 DIAGNOSIS — G47.00 INSOMNIA, UNSPECIFIED TYPE: ICD-10-CM

## 2020-04-03 DIAGNOSIS — F41.9 ANXIETY: ICD-10-CM

## 2020-04-03 DIAGNOSIS — F41.0 PANIC ATTACKS: ICD-10-CM

## 2020-04-03 RX ORDER — DIAZEPAM 5 MG/1
5 TABLET ORAL EVERY 8 HOURS PRN
Qty: 90 TABLET | Refills: 0 | Status: SHIPPED | OUTPATIENT
Start: 2020-04-04 | End: 2020-04-29

## 2020-04-09 ENCOUNTER — TELEPHONE (OUTPATIENT)
Dept: FAMILY MEDICINE | Facility: CLINIC | Age: 55
End: 2020-04-09

## 2020-04-09 RX ORDER — METHYLPREDNISOLONE 4 MG/1
TABLET ORAL
Qty: 1 PACKAGE | Refills: 0 | Status: SHIPPED | OUTPATIENT
Start: 2020-04-09 | End: 2020-06-01

## 2020-04-09 RX ORDER — CETIRIZINE HYDROCHLORIDE 10 MG/1
10 TABLET ORAL NIGHTLY
Qty: 30 TABLET | Refills: 0 | Status: SHIPPED | OUTPATIENT
Start: 2020-04-09 | End: 2020-06-01 | Stop reason: SDUPTHER

## 2020-04-09 NOTE — TELEPHONE ENCOUNTER
Pt called to advise he has an earache and sinus issues,. He wants to know if you can call him something out to the  in [place]. Returned his call for further info, no answer.

## 2020-04-29 DIAGNOSIS — G47.00 INSOMNIA, UNSPECIFIED TYPE: ICD-10-CM

## 2020-04-29 DIAGNOSIS — F41.9 ANXIETY: ICD-10-CM

## 2020-04-29 DIAGNOSIS — F41.0 PANIC ATTACKS: ICD-10-CM

## 2020-04-29 RX ORDER — DIAZEPAM 5 MG/1
5 TABLET ORAL EVERY 8 HOURS PRN
Qty: 90 TABLET | Refills: 0 | Status: SHIPPED | OUTPATIENT
Start: 2020-05-02 | End: 2020-06-01 | Stop reason: SDUPTHER

## 2020-05-04 DIAGNOSIS — F41.9 ANXIETY: ICD-10-CM

## 2020-05-04 DIAGNOSIS — F41.0 PANIC ATTACKS: ICD-10-CM

## 2020-05-04 DIAGNOSIS — G47.00 INSOMNIA, UNSPECIFIED TYPE: ICD-10-CM

## 2020-05-04 RX ORDER — DIAZEPAM 5 MG/1
5 TABLET ORAL EVERY 8 HOURS PRN
Qty: 90 TABLET | Refills: 0 | OUTPATIENT
Start: 2020-05-04 | End: 2020-06-03

## 2020-05-05 ENCOUNTER — TELEPHONE (OUTPATIENT)
Dept: FAMILY MEDICINE | Facility: CLINIC | Age: 55
End: 2020-05-05

## 2020-05-05 NOTE — TELEPHONE ENCOUNTER
PA completed and I notified the patient he would be called with an approval or denial. He states that he paid cash for the medication.     ----- Message from Pricilla Washington sent at 5/5/2020 10:46 AM CDT -----  Contact: pt     Pt calling regarding his diazePAM (VALIUM) 5 MG tablet he is not able to get them the pharmacy said it need to have a prior authorization. Please call pharmacy to discuss.         [address] [address]  Phone: [phone] Fax: [phone]

## 2020-05-28 DIAGNOSIS — F41.9 ANXIETY: ICD-10-CM

## 2020-05-28 DIAGNOSIS — G47.00 INSOMNIA, UNSPECIFIED TYPE: ICD-10-CM

## 2020-05-28 DIAGNOSIS — F41.0 PANIC ATTACKS: ICD-10-CM

## 2020-06-01 ENCOUNTER — OFFICE VISIT (OUTPATIENT)
Dept: FAMILY MEDICINE | Facility: CLINIC | Age: 55
End: 2020-06-01
Payer: MEDICAID

## 2020-06-01 VITALS
TEMPERATURE: 99 F | SYSTOLIC BLOOD PRESSURE: 110 MMHG | WEIGHT: 274.38 LBS | BODY MASS INDEX: 38.41 KG/M2 | HEIGHT: 71 IN | OXYGEN SATURATION: 96 % | DIASTOLIC BLOOD PRESSURE: 80 MMHG | HEART RATE: 70 BPM

## 2020-06-01 DIAGNOSIS — F31.9 BIPOLAR 1 DISORDER: ICD-10-CM

## 2020-06-01 DIAGNOSIS — Z79.899 LONG TERM CURRENT USE OF THERAPEUTIC DRUG: ICD-10-CM

## 2020-06-01 DIAGNOSIS — R73.01 ELEVATED FASTING GLUCOSE: ICD-10-CM

## 2020-06-01 DIAGNOSIS — M50.20 SLIPPED DISC IN NECK: Primary | ICD-10-CM

## 2020-06-01 DIAGNOSIS — F32.A DEPRESSION, UNSPECIFIED DEPRESSION TYPE: ICD-10-CM

## 2020-06-01 DIAGNOSIS — G47.00 INSOMNIA, UNSPECIFIED TYPE: ICD-10-CM

## 2020-06-01 DIAGNOSIS — F41.9 ANXIETY: ICD-10-CM

## 2020-06-01 DIAGNOSIS — G47.30 SLEEP APNEA, UNSPECIFIED TYPE: ICD-10-CM

## 2020-06-01 DIAGNOSIS — E78.6 LOW HDL (UNDER 40): ICD-10-CM

## 2020-06-01 DIAGNOSIS — F41.0 PANIC ATTACKS: ICD-10-CM

## 2020-06-01 PROCEDURE — 99213 PR OFFICE/OUTPT VISIT, EST, LEVL III, 20-29 MIN: ICD-10-PCS | Mod: S$GLB,,, | Performed by: NURSE PRACTITIONER

## 2020-06-01 PROCEDURE — 99213 OFFICE O/P EST LOW 20 MIN: CPT | Mod: S$GLB,,, | Performed by: NURSE PRACTITIONER

## 2020-06-01 RX ORDER — DIAZEPAM 5 MG/1
TABLET ORAL
Qty: 90 TABLET | Refills: 0 | OUTPATIENT
Start: 2020-06-01

## 2020-06-01 RX ORDER — SILDENAFIL 100 MG/1
100 TABLET, FILM COATED ORAL DAILY PRN
COMMUNITY
End: 2020-12-23

## 2020-06-01 RX ORDER — CETIRIZINE HYDROCHLORIDE 10 MG/1
10 TABLET ORAL NIGHTLY
Qty: 30 TABLET | Refills: 5 | Status: SHIPPED | OUTPATIENT
Start: 2020-06-01 | End: 2020-12-31 | Stop reason: SDUPTHER

## 2020-06-01 RX ORDER — DIAZEPAM 5 MG/1
5 TABLET ORAL EVERY 8 HOURS PRN
Qty: 90 TABLET | Refills: 0 | Status: SHIPPED | OUTPATIENT
Start: 2020-06-01 | End: 2020-07-06

## 2020-06-01 NOTE — PROGRESS NOTES
Clinic Note  6/1/2020      Subjective:       Patient ID:  BEATRIZ is a 55 y.o. male being seen in office today as an established patient.     Chief Complaint: Follow-up (3 month)    Mr. Fowler is here today for his 3 month follow-up. He has a hx of bipolar depression, anxiety, insomnia, panic attacks, and chronic back/neck pain. He had a recent EMG and is scheduled for a procedure with Dr. Yoder on July 16, 2020 for his neck. He reports neck and back pain that radiates to the buttocks and down his legs. He states the Gabapentin does very little to help.    Bipolar Depression-sees mental health provider at [facility].  Reports compliance on Cymbalta and Abilify, working well for him. Denies side effects from not taking or SI/HI    Panic attacks-currently well controlled on Valium. He is not taking everyday. States he attributes his attacks to stress related factors and is learning to control them as best he can. He discussed with his mental health provider and they recommend he continue, but he states they will not prescribe it.     Sleep apnea-he has a hx of sleep apnea and had a CPAP but has been out of supplies for sometime. [employer] instructed him insurance will not cover b/c he is overdue for sleep study. He reports day time sleepiness, snoring, and often waking up at night.     He was on a Keto diet and had lost 29lbs, however he has gained 12lb back since the quarantine. He states he plans to resume diet and exercise starting today.     Family History   Problem Relation Age of Onset    Diabetes Mother     Hypertension Mother     Lung cancer Brother      Past Surgical History:   Procedure Laterality Date    BACK SURGERY  03/01/2019    UMBILICAL HERNIA REPAIR  11/01/2018    W/ MESH     Social History     Substance and Sexual Activity   Alcohol Use Not Currently    Frequency: Never     Patient has no known allergies.  Medication List with Changes/Refills   Current Medications    ARIPIPRAZOLE  (ABILIFY) 5 MG TAB    1/2 tab by mouth twice daily    DIPHENHYDRAMINE (BENADRYL) 50 MG CAPSULE    Take 50 mg by mouth nightly as needed for Itching.    DULOXETINE (CYMBALTA) 60 MG CAPSULE    Take 1 capsule (60 mg total) by mouth every evening.    ESCITALOPRAM OXALATE (LEXAPRO) 20 MG TABLET    Take 20 mg by mouth every evening.    FINASTERIDE (PROSCAR) 5 MG TABLET    Take 5 mg by mouth once daily.    GABAPENTIN (NEURONTIN) 600 MG TABLET    Take 600 mg by mouth 3 (three) times daily.    LATANOPROST 0.005 % OPHTHALMIC SOLUTION    INSTILL 1 DROP INTO EACH EYE EVERY NIGHT AS DIRECTED    MELATONIN 1 MG TAB    Take by mouth.    SILDENAFIL (VIAGRA) 100 MG TABLET    Take 100 mg by mouth daily as needed for Erectile Dysfunction.    TAMSULOSIN (FLOMAX) 0.4 MG CAP    TAKE 1 CAPSULE BY MOUTH ONCE DAILY 30 MINUTES AFTER A MEAL    TIZANIDINE (ZANAFLEX) 4 MG TABLET    TAKE 1 & 1 2 TABLETS BY MOUTH THREE TIMES DAILY AS NEEDED FOR MUSCLE SPASM   Changed and/or Refilled Medications    Modified Medication Previous Medication    CETIRIZINE (ZYRTEC) 10 MG TABLET cetirizine (ZYRTEC) 10 MG tablet       Take 1 tablet (10 mg total) by mouth every evening.    Take 1 tablet (10 mg total) by mouth every evening.    DIAZEPAM (VALIUM) 5 MG TABLET diazePAM (VALIUM) 5 MG tablet       Take 1 tablet (5 mg total) by mouth every 8 (eight) hours as needed for Anxiety or Insomnia.    Take 1 tablet (5 mg total) by mouth every 8 (eight) hours as needed for Anxiety.   Discontinued Medications    METHYLPREDNISOLONE (MEDROL DOSEPACK) 4 MG TABLET    use as directed    TADALAFIL (CIALIS) 10 MG TABLET    Take 1 tablet (10 mg total) by mouth daily as needed for Erectile Dysfunction.       Review of Systems   Constitutional: Negative for appetite change, diaphoresis, fatigue and fever.   HENT: Negative for congestion, ear pain, postnasal drip, rhinorrhea, sinus pressure, sinus pain and sore throat.    Respiratory: Negative for cough, shortness of breath, wheezing  "and stridor.    Cardiovascular: Negative for chest pain, palpitations and leg swelling.   Gastrointestinal: Negative for abdominal pain, blood in stool, constipation, diarrhea, nausea and vomiting.   Genitourinary: Negative for flank pain, frequency, hematuria and urgency.   Musculoskeletal: Positive for back pain and neck pain. Negative for gait problem, joint swelling and myalgias.   Skin: Negative for color change and rash.   Neurological: Negative for dizziness, tremors, seizures, speech difficulty, weakness, numbness and headaches.        Tingling and sensory change to bilateral arms   Psychiatric/Behavioral: Positive for sleep disturbance. Negative for confusion, decreased concentration and hallucinations. The patient is not nervous/anxious.               /80 (BP Location: Left arm, Patient Position: Sitting, BP Method: Large (Manual))   Pulse 70   Temp 98.7 °F (37.1 °C) (Oral)   Ht 5' 11" (1.803 m)   Wt 124.5 kg (274 lb 6.4 oz)   SpO2 96%   BMI 38.27 kg/m²   Estimated body mass index is 38.27 kg/m² as calculated from the following:    Height as of this encounter: 5' 11" (1.803 m).    Weight as of this encounter: 124.5 kg (274 lb 6.4 oz).    Objective:        Physical Exam   Constitutional: He is oriented to person, place, and time. He appears well-developed and well-nourished.   HENT:   Head: Normocephalic and atraumatic.   Right Ear: Tympanic membrane normal.   Left Ear: Tympanic membrane normal.   Nose: Nose normal.   Mouth/Throat: Uvula is midline, oropharynx is clear and moist and mucous membranes are normal. No oropharyngeal exudate, posterior oropharyngeal edema or posterior oropharyngeal erythema.   Eyes: Pupils are equal, round, and reactive to light. Conjunctivae and EOM are normal.   Neck: Trachea normal, normal range of motion and full passive range of motion without pain. Neck supple. No JVD present. Carotid bruit is not present. No thyroid mass and no thyromegaly present. "   Cardiovascular: Normal rate, regular rhythm and intact distal pulses. Exam reveals no gallop and no friction rub.   No murmur heard.  Pulmonary/Chest: Effort normal and breath sounds normal. No stridor. No respiratory distress. He has no wheezes. He has no rhonchi. He has no rales.   Abdominal: Soft. Bowel sounds are normal. He exhibits no distension, no abdominal bruit and no mass. There is no tenderness. There is no CVA tenderness.   Musculoskeletal: Normal range of motion.   Lymphadenopathy:     He has no cervical adenopathy.   Neurological: He is alert and oriented to person, place, and time. He has normal strength. No cranial nerve deficit or sensory deficit.   Skin: Skin is warm, dry and intact. Capillary refill takes less than 2 seconds. No rash noted.   Psychiatric: He has a normal mood and affect. His speech is normal and behavior is normal. Judgment and thought content normal. His mood appears not anxious. Cognition and memory are normal. He does not exhibit a depressed mood. He expresses no suicidal ideation. He expresses no suicidal plans and no homicidal plans.   Nursing note and vitals reviewed.         Assessment and Plan:         Dre was seen today for follow-up.    Diagnoses and all orders for this visit:    Slipped disc in neck  Comments:  Keep scheduled surgery with Paresh in July    Panic attacks  -     diazePAM (VALIUM) 5 MG tablet; Take 1 tablet (5 mg total) by mouth every 8 (eight) hours as needed for Anxiety or Insomnia.    Insomnia, unspecified type  -     diazePAM (VALIUM) 5 MG tablet; Take 1 tablet (5 mg total) by mouth every 8 (eight) hours as needed for Anxiety or Insomnia.    Anxiety  -     diazePAM (VALIUM) 5 MG tablet; Take 1 tablet (5 mg total) by mouth every 8 (eight) hours as needed for Anxiety or Insomnia.  -     Basic metabolic panel; Future  -     Basic metabolic panel    Elevated fasting glucose  -     Hemoglobin A1C; Future  -     Hemoglobin A1C  -     Basic metabolic  panel    Low HDL (under 40)  -     Lipid Panel; Future  -     Lipid Panel    Long term current use of therapeutic drug    Depression, unspecified depression type  Comments:  Continue Cymbalta and Abilify  Orders:  -     Basic metabolic panel; Future  -     Basic metabolic panel    Bipolar 1 disorder  Comments:  Continue Cymbalta and Abilify    Sleep apnea, unspecified type  Comments:  Needs new supplies. Over 1 year since study and insurance will not cover supplies according to [employer]. Will reorder sleep study    Other orders  -     cetirizine (ZYRTEC) 10 MG tablet; Take 1 tablet (10 mg total) by mouth every evening.    Will have A1C, lipids, and chemistry drawn before next follow-up appt.       Follow up:   Follow up in about 3 months (around 9/1/2020), or sooner if needed.            Genevieve Lipscomb NP

## 2020-06-05 ENCOUNTER — TELEPHONE (OUTPATIENT)
Dept: FAMILY MEDICINE | Facility: CLINIC | Age: 55
End: 2020-06-05

## 2020-06-05 NOTE — TELEPHONE ENCOUNTER
Called pt back, no answer and left a voicemail.     ----- Message from Yoli Bruner sent at 6/5/2020 12:40 PM CDT -----  Contact: Patient   Patient called in regards to speak to an nurse , patient did not state why , please call back at [phone].        Thanks,  Yoli Bruner

## 2020-08-25 LAB
ANION GAP SERPL CALC-SCNC: 7 MMOL/L (ref 8–17)
BUN/CREAT SERPL: 13.8 (ref 6–20)
CALCIUM SERPL-MCNC: 9.8 MG/DL (ref 8.6–10.2)
CARBON DIOXIDE, CO2: 32 MMOL/L (ref 22–29)
CHLORIDE: 101 MMOL/L (ref 98–107)
CHOLEST SERPL-MSCNC: 190 MG/DL (ref 100–200)
CREAT SERPL-MCNC: 0.92 MG/DL (ref 0.7–1.2)
ESTIMATED AVERAGE GLUCOSE: 121 MG/DL
GFR ESTIMATION: 85.41
GLUCOSE: 117 MG/DL (ref 74–106)
HBA1C MFR BLD: 5.8 % (ref 4–6)
HDLC SERPL-MCNC: 36 MG/DL
LDL/HDL RATIO: 3.7 (ref 1–3)
LDLC SERPL CALC-MCNC: 133 MG/DL (ref 0–100)
POTASSIUM: 5 MMOL/L (ref 3.5–5.1)
SODIUM: 140 MMOL/L (ref 136–145)
TRIGL SERPL-MCNC: 105 MG/DL (ref 0–150)
UREA NITROGEN (BUN): 12.7 MG/DL (ref 6–20)

## 2020-09-21 ENCOUNTER — OFFICE VISIT (OUTPATIENT)
Dept: FAMILY MEDICINE | Facility: CLINIC | Age: 55
End: 2020-09-21
Payer: MEDICAID

## 2020-09-21 VITALS
SYSTOLIC BLOOD PRESSURE: 128 MMHG | TEMPERATURE: 98 F | HEART RATE: 65 BPM | BODY MASS INDEX: 40.27 KG/M2 | DIASTOLIC BLOOD PRESSURE: 71 MMHG | WEIGHT: 287.69 LBS | RESPIRATION RATE: 16 BRPM | OXYGEN SATURATION: 98 % | HEIGHT: 71 IN

## 2020-09-21 DIAGNOSIS — E78.6 LOW HDL (UNDER 40): ICD-10-CM

## 2020-09-21 DIAGNOSIS — Z79.899 LONG TERM CURRENT USE OF THERAPEUTIC DRUG: ICD-10-CM

## 2020-09-21 DIAGNOSIS — R73.01 ELEVATED FASTING GLUCOSE: ICD-10-CM

## 2020-09-21 DIAGNOSIS — G47.30 SLEEP APNEA, UNSPECIFIED TYPE: ICD-10-CM

## 2020-09-21 DIAGNOSIS — F31.9 BIPOLAR 1 DISORDER: Primary | ICD-10-CM

## 2020-09-21 DIAGNOSIS — F32.A DEPRESSION, UNSPECIFIED DEPRESSION TYPE: ICD-10-CM

## 2020-09-21 DIAGNOSIS — F41.9 ANXIETY: ICD-10-CM

## 2020-09-21 DIAGNOSIS — F41.0 PANIC ATTACKS: ICD-10-CM

## 2020-09-21 PROCEDURE — 99213 OFFICE O/P EST LOW 20 MIN: CPT | Mod: S$GLB,,, | Performed by: NURSE PRACTITIONER

## 2020-09-21 PROCEDURE — 99213 PR OFFICE/OUTPT VISIT, EST, LEVL III, 20-29 MIN: ICD-10-PCS | Mod: S$GLB,,, | Performed by: NURSE PRACTITIONER

## 2020-09-21 RX ORDER — CYCLOBENZAPRINE HCL 10 MG
10 TABLET ORAL EVERY 8 HOURS PRN
COMMUNITY
Start: 2020-08-25
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
## 2020-09-28 DIAGNOSIS — H40.9 GLAUCOMA OF LEFT EYE, UNSPECIFIED GLAUCOMA TYPE: Primary | ICD-10-CM

## 2020-10-01 ENCOUNTER — TELEPHONE (OUTPATIENT)
Dept: FAMILY MEDICINE | Facility: CLINIC | Age: 55
End: 2020-10-01

## 2020-10-01 NOTE — TELEPHONE ENCOUNTER
Chronic ear problem all his life. He states he has a little blood on the other day but no longer bleeding. He does see ENT and will call their office. Pt verbalized understanding.     ----- Message from Enriqueta Kraus sent at 10/1/2020  9:15 AM CDT -----  Contact: self  Type:  Needs Medical Advice    Who Called: Dre Fowler   Symptoms (please be specific):  bleeding of the ear / pain   How long has patient had these symptoms:  week  Pharmacy name and phone #:  [address], [place] - [address] [facility];  Would the patient rather a call back or a response via [facility]? Call back  Best Call Back Number: [phone]  Additional Information: pt.Dre Fowler states he has chronic ear problems

## 2020-10-02 ENCOUNTER — TELEPHONE (OUTPATIENT)
Dept: FAMILY MEDICINE | Facility: CLINIC | Age: 55
End: 2020-10-02

## 2020-10-02 NOTE — TELEPHONE ENCOUNTER
Pt advised it is at the -copy of US to take to his eye MD. Pt verbalized understanding.     ----- Message from Pricilla Washington sent at 10/2/2020  8:58 AM CDT -----  Type:  Patient Returning Call    Who Called:pt  Who Left Message for Patient:chastity  Does the patient know what this is regarding?:results  Would the patient rather a call back or a response via [facility]? callback  Best Call Back Number:[phone]   Additional Information:

## 2020-10-05 ENCOUNTER — TELEPHONE (OUTPATIENT)
Dept: FAMILY MEDICINE | Facility: CLINIC | Age: 55
End: 2020-10-05

## 2020-10-05 NOTE — TELEPHONE ENCOUNTER
Received a voicemail  from Ankita with [facility]    She called to state the pt would need a COVID test before his sleep study on Friday. She wanted to know if there was a specific place you had patients tested or if you could sign an order from them and they would send him out to test.   Ankita did not answer so I gave a verbal message to the  to have her send the order over for you to sign but that it would not be until tomorrow. Receptionists verbalized understanding. Our fax number was provided as well.

## 2020-10-30 ENCOUNTER — PATIENT MESSAGE (OUTPATIENT)
Dept: ADMINISTRATIVE | Facility: HOSPITAL | Age: 55
End: 2020-10-30

## 2020-12-16 LAB
ABS NRBC COUNT: 0 X 10 3/UL (ref 0–0.01)
ABSOLUTE BASOPHIL: 0.05 X 10 3/UL (ref 0–0.22)
ABSOLUTE EOSINOPHIL: 0.6 X 10 3/UL (ref 0.04–0.54)
ABSOLUTE IMMATURE GRAN: 0.02 X 10 3/UL (ref 0–0.04)
ABSOLUTE LYMPHOCYTE: 1.43 X 10 3/UL (ref 0.86–4.75)
ABSOLUTE MONOCYTE: 0.5 X 10 3/UL (ref 0.22–1.08)
ALBUMIN SERPL-MCNC: 4.8 G/DL (ref 3.5–5.2)
ALBUMIN/GLOB SERPL ELPH: 1.9 {RATIO} (ref 1–2.7)
ALP ISOS SERPL LEV INH-CCNC: 81 U/L (ref 40–130)
ALT (SGPT): 29 U/L (ref 0–41)
ANION GAP SERPL CALC-SCNC: 7 MMOL/L (ref 8–17)
AST SERPL-CCNC: 17 U/L (ref 0–40)
BASOPHILS NFR BLD: 0.8 % (ref 0.2–1.2)
BILIRUBIN, TOTAL: 0.33 MG/DL (ref 0–1.2)
BUN/CREAT SERPL: 10.1 (ref 6–20)
CALCIUM SERPL-MCNC: 9.6 MG/DL (ref 8.6–10.2)
CARBON DIOXIDE, CO2: 32 MMOL/L (ref 22–29)
CHLORIDE: 107 MMOL/L (ref 98–107)
CHOLEST SERPL-MSCNC: 186 MG/DL (ref 100–200)
CREAT SERPL-MCNC: 0.94 MG/DL (ref 0.7–1.2)
EOSINOPHIL NFR BLD: 9.9 % (ref 0.7–7)
ESTIMATED AVERAGE GLUCOSE: 125 MG/DL
GFR ESTIMATION: 83.32
GLOBULIN: 2.5 G/DL (ref 1.5–4.5)
GLUCOSE: 103 MG/DL (ref 74–106)
HBA1C MFR BLD: 6 % (ref 4–6)
HCT VFR BLD AUTO: 46.3 % (ref 42–52)
HDLC SERPL-MCNC: 35 MG/DL
HGB BLD-MCNC: 15 G/DL (ref 14–18)
IMMATURE GRANULOCYTES: 0.3 % (ref 0–0.5)
LDL/HDL RATIO: 3.8 (ref 1–3)
LDLC SERPL CALC-MCNC: 133.6 MG/DL (ref 0–100)
LYMPHOCYTES NFR BLD: 23.6 % (ref 19.3–53.1)
MCH RBC QN AUTO: 30.5 PG (ref 27–32)
MCHC RBC AUTO-ENTMCNC: 32.4 G/DL (ref 32–36)
MCV RBC AUTO: 94.1 FL (ref 80–94)
MONOCYTES NFR BLD: 8.3 % (ref 4.7–12.5)
NEUTROPHILS # BLD AUTO: 3.45 X 10 3/UL (ref 2.15–7.56)
NEUTROPHILS NFR BLD: 57.1 % (ref 34–71.1)
NUCLEATED RED BLOOD CELLS: 0 /100 WBC (ref 0–0.2)
PLATELET # BLD AUTO: 260 X 10 3/UL (ref 135–400)
POTASSIUM: 4.8 MMOL/L (ref 3.5–5.1)
PROT SNV-MCNC: 7.3 G/DL (ref 6.4–8.3)
RBC # BLD AUTO: 4.92 X 10 6/UL (ref 4.7–6.1)
RDW-SD: 42.5 FL (ref 37–54)
SODIUM: 146 MMOL/L (ref 136–145)
TRIGL SERPL-MCNC: 87 MG/DL (ref 0–150)
UREA NITROGEN (BUN): 9.5 MG/DL (ref 6–20)
WBC # BLD: 6.05 X 10 3/UL (ref 4.3–10.8)

## 2020-12-23 ENCOUNTER — OFFICE VISIT (OUTPATIENT)
Dept: FAMILY MEDICINE | Facility: CLINIC | Age: 55
End: 2020-12-23
Payer: MEDICAID

## 2020-12-23 VITALS
BODY MASS INDEX: 41.02 KG/M2 | DIASTOLIC BLOOD PRESSURE: 70 MMHG | WEIGHT: 293 LBS | OXYGEN SATURATION: 99 % | SYSTOLIC BLOOD PRESSURE: 110 MMHG | HEIGHT: 71 IN | TEMPERATURE: 97 F | HEART RATE: 82 BPM

## 2020-12-23 DIAGNOSIS — Z23 INFLUENZA VACCINE NEEDED: ICD-10-CM

## 2020-12-23 DIAGNOSIS — E78.5 HYPERLIPIDEMIA, UNSPECIFIED HYPERLIPIDEMIA TYPE: ICD-10-CM

## 2020-12-23 DIAGNOSIS — G47.00 INSOMNIA, UNSPECIFIED TYPE: ICD-10-CM

## 2020-12-23 DIAGNOSIS — R73.03 PREDIABETES: Primary | ICD-10-CM

## 2020-12-23 PROCEDURE — 90686 IIV4 VACC NO PRSV 0.5 ML IM: CPT | Mod: S$GLB,,, | Performed by: FAMILY MEDICINE

## 2020-12-23 PROCEDURE — 99214 OFFICE O/P EST MOD 30 MIN: CPT | Mod: 25,S$GLB,, | Performed by: FAMILY MEDICINE

## 2020-12-23 PROCEDURE — 90471 IMMUNIZATION ADMIN: CPT | Mod: S$GLB,,, | Performed by: FAMILY MEDICINE

## 2020-12-23 PROCEDURE — 90686 FLU VACCINE (QUAD) GREATER THAN OR EQUAL TO 3YO PRESERVATIVE FREE IM: ICD-10-PCS | Mod: S$GLB,,, | Performed by: FAMILY MEDICINE

## 2020-12-23 PROCEDURE — 90471 FLU VACCINE (QUAD) GREATER THAN OR EQUAL TO 3YO PRESERVATIVE FREE IM: ICD-10-PCS | Mod: S$GLB,,, | Performed by: FAMILY MEDICINE

## 2020-12-23 PROCEDURE — 99214 PR OFFICE/OUTPT VISIT, EST, LEVL IV, 30-39 MIN: ICD-10-PCS | Mod: 25,S$GLB,, | Performed by: FAMILY MEDICINE

## 2020-12-23 RX ORDER — METFORMIN HYDROCHLORIDE 500 MG/1
500 TABLET, EXTENDED RELEASE ORAL
Qty: 90 TABLET | Refills: 1 | Status: SHIPPED | OUTPATIENT
Start: 2020-12-23 | End: 2022-02-14

## 2020-12-23 RX ORDER — TRAZODONE HYDROCHLORIDE 50 MG/1
50 TABLET ORAL NIGHTLY
Qty: 30 TABLET | Refills: 2 | Status: SHIPPED | OUTPATIENT
Start: 2020-12-23 | End: 2022-02-14

## 2020-12-23 NOTE — PROGRESS NOTES
Subjective:       Patient ID: Dre Fowler is a 55 y.o. male.    Chief Complaint: Follow-up and Flu Vaccine    HPI     F/u of chronic medical conditions.    HLD - going to try weight loss and dietary modifications for this    PreDM - a1c increased slightly from 5.8 to 6.0. admits to some weight gain. Agreeable to start metformin    Also c/o insomnia - has failed on otc meds. Was taking valium but he is still waking up during the night. Has not tried trazodone that he is aware of    Needs flu vac    Review of Systems   Constitutional: Negative for chills, diaphoresis, fatigue and fever.   Eyes: Negative for visual disturbance.   Respiratory: Negative for cough, chest tightness and shortness of breath.    Cardiovascular: Negative for chest pain and palpitations.   Gastrointestinal: Negative for abdominal pain, nausea and vomiting.   Endocrine: Negative for polydipsia and polyuria.   Genitourinary: Negative for decreased urine volume, dysuria and frequency.   Musculoskeletal: Negative for myalgias.   Integumentary:  Negative for pallor and rash.   Neurological: Negative for dizziness, syncope, weakness, light-headedness and headaches.   Psychiatric/Behavioral: Positive for sleep disturbance. Negative for dysphoric mood. The patient is not nervous/anxious.          Objective:      Physical Exam  Constitutional:       General: He is not in acute distress.     Appearance: He is well-developed. He is not diaphoretic.   HENT:      Head: Normocephalic and atraumatic.      Nose: Nose normal.   Eyes:      Conjunctiva/sclera: Conjunctivae normal.   Neck:      Musculoskeletal: Normal range of motion and neck supple.      Thyroid: No thyromegaly.   Cardiovascular:      Rate and Rhythm: Normal rate and regular rhythm.      Pulses: Normal pulses.      Heart sounds: Normal heart sounds. No murmur. No friction rub. No gallop.    Pulmonary:      Effort: Pulmonary effort is normal. No respiratory distress.      Breath sounds: Normal  breath sounds. No stridor. No wheezing, rhonchi or rales.   Musculoskeletal:      Right lower leg: No edema.      Left lower leg: No edema.   Skin:     General: Skin is warm and dry.   Neurological:      Mental Status: He is alert and oriented to person, place, and time.   Psychiatric:         Mood and Affect: Mood normal.         Behavior: Behavior normal.         Assessment:       1. Prediabetes    2. Hyperlipidemia, unspecified hyperlipidemia type    3. Insomnia, unspecified type    4. Influenza vaccine needed    5. BMI 40.0-44.9, adult        Plan:       Dre was seen today for follow-up and flu vaccine.    Diagnoses and all orders for this visit:    Prediabetes  -     metFORMIN (GLUCOPHAGE-XR) 500 MG ER 24hr tablet; Take 1 tablet (500 mg total) by mouth daily with breakfast.    Hyperlipidemia, unspecified hyperlipidemia type  Comments:  weight loss, exercise and dietary modifications    Insomnia, unspecified type  -     traZODone (DESYREL) 50 MG tablet; Take 1 tablet (50 mg total) by mouth every evening.    Influenza vaccine needed  -     Influenza - Quadrivalent *Preferred* (6 months+) (PF)    BMI 40.0-44.9, adult  Comments:  counseled on weight loss plan and goal

## 2021-01-04 RX ORDER — CETIRIZINE HYDROCHLORIDE 10 MG/1
10 TABLET ORAL NIGHTLY
Qty: 30 TABLET | Refills: 5 | Status: SHIPPED | OUTPATIENT
Start: 2021-01-04 | End: 2023-06-01

## 2021-01-07 DIAGNOSIS — G47.00 INSOMNIA, UNSPECIFIED TYPE: ICD-10-CM

## 2021-01-07 DIAGNOSIS — F41.0 PANIC ATTACKS: ICD-10-CM

## 2021-01-07 DIAGNOSIS — F41.9 ANXIETY: ICD-10-CM

## 2021-01-07 RX ORDER — DIAZEPAM 5 MG/1
5 TABLET ORAL EVERY 8 HOURS PRN
Qty: 90 TABLET | Refills: 0 | Status: SHIPPED | OUTPATIENT
Start: 2021-01-07 | End: 2021-02-04 | Stop reason: SDUPTHER

## 2021-02-04 DIAGNOSIS — F41.9 ANXIETY: ICD-10-CM

## 2021-02-04 DIAGNOSIS — G47.00 INSOMNIA, UNSPECIFIED TYPE: ICD-10-CM

## 2021-02-04 DIAGNOSIS — F41.0 PANIC ATTACKS: ICD-10-CM

## 2021-02-04 RX ORDER — DIAZEPAM 5 MG/1
5 TABLET ORAL EVERY 8 HOURS PRN
Qty: 90 TABLET | Refills: 0 | Status: SHIPPED | OUTPATIENT
Start: 2021-02-04 | End: 2021-03-06 | Stop reason: SDUPTHER

## 2021-03-03 ENCOUNTER — TELEPHONE (OUTPATIENT)
Dept: FAMILY MEDICINE | Facility: CLINIC | Age: 56
End: 2021-03-03

## 2021-03-03 DIAGNOSIS — F41.0 PANIC ATTACKS: ICD-10-CM

## 2021-03-03 DIAGNOSIS — G47.00 INSOMNIA, UNSPECIFIED TYPE: ICD-10-CM

## 2021-03-03 DIAGNOSIS — F41.9 ANXIETY: ICD-10-CM

## 2021-03-03 RX ORDER — DIAZEPAM 5 MG/1
5 TABLET ORAL EVERY 8 HOURS PRN
Qty: 90 TABLET | Refills: 0 | OUTPATIENT
Start: 2021-03-03

## 2021-03-06 DIAGNOSIS — G47.00 INSOMNIA, UNSPECIFIED TYPE: ICD-10-CM

## 2021-03-06 DIAGNOSIS — F41.0 PANIC ATTACKS: ICD-10-CM

## 2021-03-06 DIAGNOSIS — F41.9 ANXIETY: ICD-10-CM

## 2021-03-08 RX ORDER — DIAZEPAM 5 MG/1
5 TABLET ORAL EVERY 8 HOURS PRN
Qty: 90 TABLET | Refills: 0 | Status: SHIPPED | OUTPATIENT
Start: 2021-03-08 | End: 2021-04-07 | Stop reason: SDUPTHER

## 2021-04-07 DIAGNOSIS — F41.9 ANXIETY: ICD-10-CM

## 2021-04-07 DIAGNOSIS — G47.00 INSOMNIA, UNSPECIFIED TYPE: ICD-10-CM

## 2021-04-07 DIAGNOSIS — F41.0 PANIC ATTACKS: ICD-10-CM

## 2021-04-08 ENCOUNTER — OFFICE VISIT (OUTPATIENT)
Dept: FAMILY MEDICINE | Facility: CLINIC | Age: 56
End: 2021-04-08
Payer: MEDICAID

## 2021-04-08 VITALS
OXYGEN SATURATION: 97 % | DIASTOLIC BLOOD PRESSURE: 70 MMHG | RESPIRATION RATE: 18 BRPM | HEIGHT: 71 IN | TEMPERATURE: 98 F | SYSTOLIC BLOOD PRESSURE: 132 MMHG | WEIGHT: 274 LBS | BODY MASS INDEX: 38.36 KG/M2 | HEART RATE: 74 BPM

## 2021-04-08 DIAGNOSIS — F41.9 ANXIETY: ICD-10-CM

## 2021-04-08 DIAGNOSIS — G47.00 INSOMNIA, UNSPECIFIED TYPE: ICD-10-CM

## 2021-04-08 DIAGNOSIS — Z00.00 PREVENTATIVE HEALTH CARE: ICD-10-CM

## 2021-04-08 DIAGNOSIS — R73.03 PREDIABETES: Primary | ICD-10-CM

## 2021-04-08 DIAGNOSIS — E78.5 HYPERLIPIDEMIA, UNSPECIFIED HYPERLIPIDEMIA TYPE: ICD-10-CM

## 2021-04-08 PROBLEM — R19.01 ABDOMINAL MASS, RIGHT UPPER QUADRANT: Status: RESOLVED | Noted: 2019-10-16 | Resolved: 2021-04-08

## 2021-04-08 PROCEDURE — 99214 OFFICE O/P EST MOD 30 MIN: CPT | Mod: S$GLB,,, | Performed by: NURSE PRACTITIONER

## 2021-04-08 PROCEDURE — 99214 PR OFFICE/OUTPT VISIT, EST, LEVL IV, 30-39 MIN: ICD-10-PCS | Mod: S$GLB,,, | Performed by: NURSE PRACTITIONER

## 2021-04-08 RX ORDER — DIAZEPAM 5 MG/1
5 TABLET ORAL EVERY 8 HOURS PRN
Qty: 90 TABLET | Refills: 0 | Status: SHIPPED | OUTPATIENT
Start: 2021-04-08 | End: 2021-05-04 | Stop reason: SDUPTHER

## 2021-05-04 DIAGNOSIS — F41.9 ANXIETY: ICD-10-CM

## 2021-05-04 DIAGNOSIS — G47.00 INSOMNIA, UNSPECIFIED TYPE: ICD-10-CM

## 2021-05-04 DIAGNOSIS — F41.0 PANIC ATTACKS: ICD-10-CM

## 2021-05-04 RX ORDER — DIAZEPAM 5 MG/1
5 TABLET ORAL EVERY 8 HOURS PRN
Qty: 90 TABLET | Refills: 0 | Status: SHIPPED | OUTPATIENT
Start: 2021-05-07 | End: 2021-06-06 | Stop reason: SDUPTHER

## 2021-05-05 ENCOUNTER — TELEPHONE (OUTPATIENT)
Dept: FAMILY MEDICINE | Facility: CLINIC | Age: 56
End: 2021-05-05

## 2021-06-06 DIAGNOSIS — F41.0 PANIC ATTACKS: ICD-10-CM

## 2021-06-06 DIAGNOSIS — G47.00 INSOMNIA, UNSPECIFIED TYPE: ICD-10-CM

## 2021-06-06 DIAGNOSIS — F41.9 ANXIETY: ICD-10-CM

## 2021-06-07 RX ORDER — DIAZEPAM 5 MG/1
5 TABLET ORAL EVERY 8 HOURS PRN
Qty: 90 TABLET | Refills: 0 | Status: SHIPPED | OUTPATIENT
Start: 2021-06-07 | End: 2021-07-08 | Stop reason: SDUPTHER

## 2021-07-05 DIAGNOSIS — F41.0 PANIC ATTACKS: ICD-10-CM

## 2021-07-05 DIAGNOSIS — G47.00 INSOMNIA, UNSPECIFIED TYPE: ICD-10-CM

## 2021-07-05 DIAGNOSIS — F41.9 ANXIETY: ICD-10-CM

## 2021-07-06 ENCOUNTER — PATIENT MESSAGE (OUTPATIENT)
Dept: OBSTETRICS AND GYNECOLOGY | Facility: CLINIC | Age: 56
End: 2021-07-06

## 2021-07-06 RX ORDER — DIAZEPAM 5 MG/1
5 TABLET ORAL EVERY 8 HOURS PRN
Qty: 90 TABLET | Refills: 0 | OUTPATIENT
Start: 2021-07-06

## 2021-07-08 ENCOUNTER — OFFICE VISIT (OUTPATIENT)
Dept: FAMILY MEDICINE | Facility: CLINIC | Age: 56
End: 2021-07-08
Payer: MEDICAID

## 2021-07-08 VITALS
RESPIRATION RATE: 17 BRPM | OXYGEN SATURATION: 96 % | HEIGHT: 71 IN | BODY MASS INDEX: 39.06 KG/M2 | SYSTOLIC BLOOD PRESSURE: 134 MMHG | DIASTOLIC BLOOD PRESSURE: 67 MMHG | WEIGHT: 279 LBS | HEART RATE: 100 BPM

## 2021-07-08 DIAGNOSIS — F41.9 ANXIETY: ICD-10-CM

## 2021-07-08 DIAGNOSIS — G47.00 INSOMNIA, UNSPECIFIED TYPE: ICD-10-CM

## 2021-07-08 DIAGNOSIS — F41.0 PANIC ATTACKS: ICD-10-CM

## 2021-07-08 DIAGNOSIS — Z76.89 ENCOUNTER TO ESTABLISH CARE WITH NEW DOCTOR: Primary | ICD-10-CM

## 2021-07-08 DIAGNOSIS — R73.03 PREDIABETES: ICD-10-CM

## 2021-07-08 DIAGNOSIS — Z79.899 LONG TERM CURRENT USE OF THERAPEUTIC DRUG: ICD-10-CM

## 2021-07-08 PROCEDURE — 99214 PR OFFICE/OUTPT VISIT, EST, LEVL IV, 30-39 MIN: ICD-10-PCS | Mod: S$GLB,,, | Performed by: NURSE PRACTITIONER

## 2021-07-08 PROCEDURE — 99214 OFFICE O/P EST MOD 30 MIN: CPT | Mod: S$GLB,,, | Performed by: NURSE PRACTITIONER

## 2021-07-08 RX ORDER — DIAZEPAM 5 MG/1
5 TABLET ORAL NIGHTLY PRN
Qty: 30 TABLET | Refills: 1 | Status: SHIPPED | OUTPATIENT
Start: 2021-07-08 | End: 2021-09-03 | Stop reason: SDUPTHER

## 2021-07-29 ENCOUNTER — OFFICE VISIT (OUTPATIENT)
Dept: FAMILY MEDICINE | Facility: CLINIC | Age: 56
End: 2021-07-29
Payer: MEDICAID

## 2021-07-29 VITALS
DIASTOLIC BLOOD PRESSURE: 70 MMHG | BODY MASS INDEX: 39.06 KG/M2 | HEART RATE: 79 BPM | HEIGHT: 71 IN | RESPIRATION RATE: 18 BRPM | SYSTOLIC BLOOD PRESSURE: 108 MMHG | WEIGHT: 279 LBS | OXYGEN SATURATION: 96 %

## 2021-07-29 DIAGNOSIS — G47.30 SLEEP APNEA, UNSPECIFIED TYPE: ICD-10-CM

## 2021-07-29 DIAGNOSIS — Z12.11 SCREENING FOR COLON CANCER: Primary | ICD-10-CM

## 2021-07-29 PROCEDURE — 99214 OFFICE O/P EST MOD 30 MIN: CPT | Mod: S$GLB,,, | Performed by: NURSE PRACTITIONER

## 2021-07-29 PROCEDURE — 99214 PR OFFICE/OUTPT VISIT, EST, LEVL IV, 30-39 MIN: ICD-10-PCS | Mod: S$GLB,,, | Performed by: NURSE PRACTITIONER

## 2021-09-03 DIAGNOSIS — F41.9 ANXIETY: ICD-10-CM

## 2021-09-03 DIAGNOSIS — G47.00 INSOMNIA, UNSPECIFIED TYPE: ICD-10-CM

## 2021-09-03 DIAGNOSIS — Z76.89 ENCOUNTER TO ESTABLISH CARE WITH NEW DOCTOR: ICD-10-CM

## 2021-09-03 DIAGNOSIS — Z79.899 LONG TERM CURRENT USE OF THERAPEUTIC DRUG: ICD-10-CM

## 2021-09-03 DIAGNOSIS — F41.0 PANIC ATTACKS: ICD-10-CM

## 2021-09-03 DIAGNOSIS — R73.03 PREDIABETES: ICD-10-CM

## 2021-09-03 RX ORDER — DIAZEPAM 5 MG/1
5 TABLET ORAL NIGHTLY PRN
Qty: 30 TABLET | Refills: 1 | Status: SHIPPED | OUTPATIENT
Start: 2021-09-03 | End: 2021-09-08 | Stop reason: SDUPTHER

## 2021-09-08 DIAGNOSIS — G47.00 INSOMNIA, UNSPECIFIED TYPE: ICD-10-CM

## 2021-09-08 DIAGNOSIS — R73.03 PREDIABETES: ICD-10-CM

## 2021-09-08 DIAGNOSIS — F41.9 ANXIETY: ICD-10-CM

## 2021-09-08 DIAGNOSIS — Z79.899 LONG TERM CURRENT USE OF THERAPEUTIC DRUG: ICD-10-CM

## 2021-09-08 DIAGNOSIS — F41.0 PANIC ATTACKS: ICD-10-CM

## 2021-09-08 DIAGNOSIS — Z76.89 ENCOUNTER TO ESTABLISH CARE WITH NEW DOCTOR: ICD-10-CM

## 2021-09-09 RX ORDER — DIAZEPAM 5 MG/1
5 TABLET ORAL NIGHTLY PRN
Qty: 30 TABLET | Refills: 1 | Status: SHIPPED | OUTPATIENT
Start: 2021-09-09 | End: 2023-06-01

## 2022-02-14 ENCOUNTER — OFFICE VISIT (OUTPATIENT)
Dept: UROLOGY | Facility: CLINIC | Age: 57
End: 2022-02-14
Payer: MEDICAID

## 2022-02-14 VITALS — HEIGHT: 71 IN | BODY MASS INDEX: 42 KG/M2 | WEIGHT: 300 LBS

## 2022-02-14 DIAGNOSIS — R39.15 URINARY URGENCY: ICD-10-CM

## 2022-02-14 DIAGNOSIS — N13.8 BPH WITH URINARY OBSTRUCTION: Primary | ICD-10-CM

## 2022-02-14 DIAGNOSIS — N40.1 BPH WITH URINARY OBSTRUCTION: Primary | ICD-10-CM

## 2022-02-14 DIAGNOSIS — N52.9 ERECTILE DYSFUNCTION, UNSPECIFIED ERECTILE DYSFUNCTION TYPE: ICD-10-CM

## 2022-02-14 DIAGNOSIS — R97.20 ELEVATED PSA: ICD-10-CM

## 2022-02-14 LAB
POC RESIDUAL URINE VOLUME: 29 ML (ref 0–100)
PSA, DIAGNOSTIC: 2.64 NG/ML (ref 0–4)

## 2022-02-14 PROCEDURE — 51798 POCT BLADDER SCAN: ICD-10-PCS | Mod: S$GLB,,, | Performed by: NURSE PRACTITIONER

## 2022-02-14 PROCEDURE — 1160F PR REVIEW ALL MEDS BY PRESCRIBER/CLIN PHARMACIST DOCUMENTED: ICD-10-PCS | Mod: CPTII,S$GLB,, | Performed by: NURSE PRACTITIONER

## 2022-02-14 PROCEDURE — 1159F MED LIST DOCD IN RCRD: CPT | Mod: CPTII,S$GLB,, | Performed by: NURSE PRACTITIONER

## 2022-02-14 PROCEDURE — 1159F PR MEDICATION LIST DOCUMENTED IN MEDICAL RECORD: ICD-10-PCS | Mod: CPTII,S$GLB,, | Performed by: NURSE PRACTITIONER

## 2022-02-14 PROCEDURE — 99204 PR OFFICE/OUTPT VISIT, NEW, LEVL IV, 45-59 MIN: ICD-10-PCS | Mod: S$GLB,,, | Performed by: NURSE PRACTITIONER

## 2022-02-14 PROCEDURE — 3008F BODY MASS INDEX DOCD: CPT | Mod: CPTII,S$GLB,, | Performed by: NURSE PRACTITIONER

## 2022-02-14 PROCEDURE — 51798 US URINE CAPACITY MEASURE: CPT | Mod: S$GLB,,, | Performed by: NURSE PRACTITIONER

## 2022-02-14 PROCEDURE — 99204 OFFICE O/P NEW MOD 45 MIN: CPT | Mod: S$GLB,,, | Performed by: NURSE PRACTITIONER

## 2022-02-14 PROCEDURE — 1160F RVW MEDS BY RX/DR IN RCRD: CPT | Mod: CPTII,S$GLB,, | Performed by: NURSE PRACTITIONER

## 2022-02-14 PROCEDURE — 3008F PR BODY MASS INDEX (BMI) DOCUMENTED: ICD-10-PCS | Mod: CPTII,S$GLB,, | Performed by: NURSE PRACTITIONER

## 2022-02-14 RX ORDER — ARIPIPRAZOLE 20 MG/1
TABLET ORAL
COMMUNITY
Start: 2022-01-12 | End: 2023-06-01

## 2022-02-14 RX ORDER — PANTOPRAZOLE SODIUM 40 MG/1
40 TABLET, DELAYED RELEASE ORAL DAILY
COMMUNITY
Start: 2022-01-28

## 2022-02-14 RX ORDER — CHOLECALCIFEROL (VITAMIN D3) 125 MCG
5000 CAPSULE ORAL DAILY
COMMUNITY
Start: 2022-01-08

## 2022-02-14 RX ORDER — TADALAFIL 20 MG/1
TABLET ORAL
COMMUNITY
Start: 2021-09-09 | End: 2023-06-01

## 2022-02-14 RX ORDER — CYCLOBENZAPRINE HCL 5 MG
TABLET ORAL
COMMUNITY
Start: 2021-08-23 | End: 2022-07-25 | Stop reason: ALTCHOICE

## 2022-02-15 ENCOUNTER — TELEPHONE (OUTPATIENT)
Dept: UROLOGY | Facility: CLINIC | Age: 57
End: 2022-02-15
Payer: MEDICAID

## 2022-02-15 NOTE — TELEPHONE ENCOUNTER
Patient notified of results. [facility] LPN    ----- Message from Dre Christensen NP sent at 2/14/2022  5:05 PM CST -----  PSA is WNL.  Will repeat at next visit.

## 2022-03-01 RX ORDER — FINASTERIDE 5 MG/1
5 TABLET, FILM COATED ORAL DAILY
Qty: 30 TABLET | Refills: 6 | Status: SHIPPED | OUTPATIENT
Start: 2022-03-01 | End: 2022-10-15 | Stop reason: SDUPTHER

## 2022-03-01 RX ORDER — TAMSULOSIN HYDROCHLORIDE 0.4 MG/1
0.4 CAPSULE ORAL DAILY
Qty: 30 CAPSULE | Refills: 6 | Status: SHIPPED | OUTPATIENT
Start: 2022-03-01 | End: 2022-10-15 | Stop reason: SDUPTHER

## 2022-03-01 NOTE — TELEPHONE ENCOUNTER
----- Message from Amira Gayle sent at 3/1/2022 12:28 PM CST -----  Contact: self  Pt calling for refill on finasteride (PROSCAR) 5 mg and tamsulosin (FLOMAX) 0.4 mg Pt can be reached at [phone]       [address], [place] - [address] [address]  Phone: [phone] Fax: [phone]    Thanks,

## 2022-03-22 ENCOUNTER — PATIENT MESSAGE (OUTPATIENT)
Dept: ADMINISTRATIVE | Facility: HOSPITAL | Age: 57
End: 2022-03-22
Payer: MEDICAID

## 2022-07-25 ENCOUNTER — OFFICE VISIT (OUTPATIENT)
Dept: UROLOGY | Facility: CLINIC | Age: 57
End: 2022-07-25
Payer: MEDICAID

## 2022-07-25 VITALS
HEIGHT: 71 IN | RESPIRATION RATE: 22 BRPM | WEIGHT: 315 LBS | SYSTOLIC BLOOD PRESSURE: 106 MMHG | HEART RATE: 71 BPM | TEMPERATURE: 98 F | DIASTOLIC BLOOD PRESSURE: 70 MMHG | BODY MASS INDEX: 44.1 KG/M2

## 2022-07-25 DIAGNOSIS — R97.20 ELEVATED PSA: ICD-10-CM

## 2022-07-25 DIAGNOSIS — R39.15 URINARY URGENCY: ICD-10-CM

## 2022-07-25 DIAGNOSIS — N40.1 BPH WITH URINARY OBSTRUCTION: Primary | ICD-10-CM

## 2022-07-25 DIAGNOSIS — N13.8 BPH WITH URINARY OBSTRUCTION: Primary | ICD-10-CM

## 2022-07-25 DIAGNOSIS — N52.9 ERECTILE DYSFUNCTION, UNSPECIFIED ERECTILE DYSFUNCTION TYPE: ICD-10-CM

## 2022-07-25 LAB — POC RESIDUAL URINE VOLUME: 4 ML (ref 0–100)

## 2022-07-25 PROCEDURE — 3008F PR BODY MASS INDEX (BMI) DOCUMENTED: ICD-10-PCS | Mod: CPTII,S$GLB,, | Performed by: NURSE PRACTITIONER

## 2022-07-25 PROCEDURE — 3074F SYST BP LT 130 MM HG: CPT | Mod: CPTII,S$GLB,, | Performed by: NURSE PRACTITIONER

## 2022-07-25 PROCEDURE — 51798 POCT BLADDER SCAN: ICD-10-PCS | Mod: S$GLB,,, | Performed by: NURSE PRACTITIONER

## 2022-07-25 PROCEDURE — 1160F RVW MEDS BY RX/DR IN RCRD: CPT | Mod: CPTII,S$GLB,, | Performed by: NURSE PRACTITIONER

## 2022-07-25 PROCEDURE — 99214 OFFICE O/P EST MOD 30 MIN: CPT | Mod: S$GLB,,, | Performed by: NURSE PRACTITIONER

## 2022-07-25 PROCEDURE — 3078F PR MOST RECENT DIASTOLIC BLOOD PRESSURE < 80 MM HG: ICD-10-PCS | Mod: CPTII,S$GLB,, | Performed by: NURSE PRACTITIONER

## 2022-07-25 PROCEDURE — 51798 US URINE CAPACITY MEASURE: CPT | Mod: S$GLB,,, | Performed by: NURSE PRACTITIONER

## 2022-07-25 PROCEDURE — 3074F PR MOST RECENT SYSTOLIC BLOOD PRESSURE < 130 MM HG: ICD-10-PCS | Mod: CPTII,S$GLB,, | Performed by: NURSE PRACTITIONER

## 2022-07-25 PROCEDURE — 3008F BODY MASS INDEX DOCD: CPT | Mod: CPTII,S$GLB,, | Performed by: NURSE PRACTITIONER

## 2022-07-25 PROCEDURE — 99214 PR OFFICE/OUTPT VISIT, EST, LEVL IV, 30-39 MIN: ICD-10-PCS | Mod: S$GLB,,, | Performed by: NURSE PRACTITIONER

## 2022-07-25 PROCEDURE — 1159F PR MEDICATION LIST DOCUMENTED IN MEDICAL RECORD: ICD-10-PCS | Mod: CPTII,S$GLB,, | Performed by: NURSE PRACTITIONER

## 2022-07-25 PROCEDURE — 3078F DIAST BP <80 MM HG: CPT | Mod: CPTII,S$GLB,, | Performed by: NURSE PRACTITIONER

## 2022-07-25 PROCEDURE — 1160F PR REVIEW ALL MEDS BY PRESCRIBER/CLIN PHARMACIST DOCUMENTED: ICD-10-PCS | Mod: CPTII,S$GLB,, | Performed by: NURSE PRACTITIONER

## 2022-07-25 PROCEDURE — 1159F MED LIST DOCD IN RCRD: CPT | Mod: CPTII,S$GLB,, | Performed by: NURSE PRACTITIONER

## 2022-07-25 RX ORDER — TRAZODONE HYDROCHLORIDE 50 MG/1
50 TABLET ORAL NIGHTLY
COMMUNITY
Start: 2022-06-18

## 2022-07-25 RX ORDER — BUSPIRONE HYDROCHLORIDE 30 MG/1
30 TABLET ORAL 2 TIMES DAILY
COMMUNITY
Start: 2022-07-11 | End: 2023-06-01

## 2022-07-25 RX ORDER — NEPAFENAC 3 MG/ML
SUSPENSION/ DROPS OPHTHALMIC
COMMUNITY
Start: 2022-07-12 | End: 2023-06-01

## 2022-07-25 RX ORDER — DUREZOL 0.5 MG/ML
EMULSION OPHTHALMIC
COMMUNITY
Start: 2022-07-01 | End: 2023-06-01

## 2022-07-25 RX ORDER — OXYBUTYNIN CHLORIDE 15 MG/1
15 TABLET, EXTENDED RELEASE ORAL DAILY
Qty: 30 TABLET | Refills: 11 | Status: SHIPPED | OUTPATIENT
Start: 2022-07-25 | End: 2023-06-01

## 2022-07-25 RX ORDER — POLYMYXIN B SULFATE AND TRIMETHOPRIM 1; 10000 MG/ML; [USP'U]/ML
SOLUTION OPHTHALMIC
COMMUNITY
Start: 2022-07-01 | End: 2023-06-01

## 2022-07-25 RX ORDER — METOPROLOL SUCCINATE 100 MG/1
TABLET, EXTENDED RELEASE ORAL
COMMUNITY
Start: 2022-07-07

## 2022-07-25 RX ORDER — PRAZOSIN HYDROCHLORIDE 1 MG/1
CAPSULE ORAL
COMMUNITY
Start: 2022-05-29

## 2022-07-25 RX ORDER — METFORMIN HYDROCHLORIDE 500 MG/1
TABLET ORAL
COMMUNITY
Start: 2022-06-09

## 2022-07-25 NOTE — PROGRESS NOTES
Subjective:       Patient ID: Dre Fowler is a 57 y.o. male.    Chief Complaint: Benign Prostatic Hypertrophy and leakage after urination      HPI:  57-year-old male, established patient, presents for 3 month visit.    Patient has history BPH with obstruction.    He is on Flomax 0.4 mg daily.      Patient has history of an elevated PSA.    Patient had had a biopsy with Dr. Brar, which was benign.   PSA at last visit 3 months ago was 2.64.    Patient also has history of erectile dysfunction.    He is on generic Cialis 20 mg as needed.    Patient states is working well with no complaints or complications.      At last visit patient is complaining of episodes of urgency with leakage.    We discussed behavioral and dietary modifications.    Patient presents today stating that he has had no change in his urgency and leakage with dietary behavior modifications.  He continues to have urgency and leakage.       Past Medical History:   Past Medical History:   Diagnosis Date    Anxiety disorder     Bipolar 1 disorder     Depression     Diabetes mellitus     Elevated heart rate with elevated blood pressure and diagnosis of hypertension     Prostatitis     Slipped disc in neck     RT - DR STEVENSON       Past Surgical Historical:   Past Surgical History:   Procedure Laterality Date    BACK SURGERY  03/01/2019    lens repair Left     for Glaumona and Implant    UMBILICAL HERNIA REPAIR  11/01/2018    W/ MESH    VASECTOMY          Medications:   Medication List with Changes/Refills   New Medications    OXYBUTYNIN (DITROPAN XL) 15 MG TR24    Take 1 tablet (15 mg total) by mouth once daily.   Current Medications    ARIPIPRAZOLE (ABILIFY) 20 MG TAB    TAKE 1/2 (ONE-HALF) TABLET BY MOUTH TWICE DAILY AS DIRECTED    ARIPIPRAZOLE (ABILIFY) 5 MG TAB    1/2 tab by mouth twice daily    BUSPIRONE (BUSPAR) 30 MG TAB    Take 30 mg by mouth 2 (two) times daily.    CETIRIZINE (ZYRTEC) 10 MG TABLET    Take 1 tablet (10 mg total) by  mouth every evening.    CHOLECALCIFEROL, VITAMIN D3, 125 MCG (5,000 UNIT) CAPSULE    Take 5,000 Units by mouth once daily. take with food    CYCLOBENZAPRINE (FLEXERIL) 10 MG TABLET    Take 10 mg by mouth every 8 (eight) hours as needed. FOR MUSCLE SPASM    CYCLOBENZAPRINE (FLEXERIL) 5 MG TABLET    TAKE 1 TABLET BY MOUTH THREE TIMES DAILY AS NEEDED FOR BACK ACHE    DIAZEPAM (VALIUM) 5 MG TABLET    Take 1 tablet (5 mg total) by mouth nightly as needed for Insomnia.    DULOXETINE (CYMBALTA) 60 MG CAPSULE    Take 1 capsule (60 mg total) by mouth every evening.    DUREZOL 0.05 % DROP OPHTHALMIC SOLUTION    INSTILL 1 DROP IN OPERATIVE EYE THREE TIMES DAILY FOR 1 DAY PREOP, THEN FOUR TIMES DAILY DAY OF SURGERY, THEN TWICE DAILY FOR 2 WEEKS, THEN DAILY FOR 2 WEEKS    FINASTERIDE (PROSCAR) 5 MG TABLET    Take 1 tablet (5 mg total) by mouth once daily.    GABAPENTIN (NEURONTIN) 600 MG TABLET    Take 600 mg by mouth 3 (three) times daily.    ILEVRO 0.3 % DRPS    INSTILL 1 DROP INTO OPERATIVE EYE FOUR TIMES DAILY FOR 1 DAY PREOP, THE DAY OF SURGERY AND FOR ONE WEEK    MELATONIN 1 MG TAB    Take by mouth.    METFORMIN (GLUCOPHAGE) 500 MG TABLET    TAKE 1 TABLET BY MOUTH TWICE DAILY FOR 7 DAYS THEN TAKE 2 TABLETS TWICE DAILY    METOPROLOL SUCCINATE (TOPROL-XL) 100 MG 24 HR TABLET    TAKE 1/2 TABLET BY MOUTH EVERY MORNING FOR 4 DAYS, THEN 1 TAB DAILY FOR 4 DAYS, THEN 1.5 TABS DAILY IF NOT ALREADY A LOT BETTER    PANTOPRAZOLE (PROTONIX) 40 MG TABLET    Take 40 mg by mouth once daily.    POLYMYXIN B SULF-TRIMETHOPRIM (POLYTRIM) 10,000 UNIT- 1 MG/ML DROP    APPLY 1 DROP IN OPERATIVE EYE FOUR TIMES DAILY 1 DAY PREOP, THEN FOUR TIMES DAILY DAY OF SURGERY, THEN FOUR TIMES DAILY FOR 1 WEEK    PRAZOSIN (MINIPRESS) 1 MG CAP    TAKE 1 CAPSULE BY MOUTH AT BEDTIME FOR NIGHTMARES    TADALAFIL (CIALIS) 20 MG TAB        TAMSULOSIN (FLOMAX) 0.4 MG CAP    Take 1 capsule (0.4 mg total) by mouth once daily.    TRAZODONE (DESYREL) 50 MG TABLET     Take 50 mg by mouth nightly.        Past Social History:   Social History     Socioeconomic History    Marital status: Single   Tobacco Use    Smoking status: Former Smoker     Packs/day: 1.00     Years: 30.00     Pack years: 30.00     Types: Cigarettes    Smokeless tobacco: Former User     Quit date: 1/1/2016   Substance and Sexual Activity    Alcohol use: Not Currently    Drug use: Never       Allergies: Review of patient's allergies indicates:  No Known Allergies     Family History:   Family History   Problem Relation Age of Onset    Diabetes Mother     Hypertension Mother     Lung cancer Brother         Review of Systems:  Review of Systems   Constitutional: Negative for activity change and appetite change.   HENT: Negative for congestion and dental problem.    Eyes: Negative for visual disturbance.   Respiratory: Negative for chest tightness and shortness of breath.    Cardiovascular: Negative for chest pain.   Gastrointestinal: Negative for abdominal distention and abdominal pain.   Genitourinary: Positive for urgency. Negative for decreased urine volume, difficulty urinating, dysuria, enuresis, flank pain, frequency, genital sores, hematuria, penile discharge, penile pain, penile swelling, scrotal swelling and testicular pain.   Musculoskeletal: Negative for back pain and neck pain.   Skin: Negative for color change.   Neurological: Negative for dizziness.   Hematological: Negative for adenopathy.   Psychiatric/Behavioral: Negative for agitation, behavioral problems and confusion.       Physical Exam:  Physical Exam  Vitals and nursing note reviewed.   Constitutional:       Appearance: He is well-developed.   HENT:      Head: Normocephalic.   Eyes:      Pupils: Pupils are equal, round, and reactive to light.   Cardiovascular:      Rate and Rhythm: Normal rate and regular rhythm.      Heart sounds: Normal heart sounds.   Pulmonary:      Effort: Pulmonary effort is normal.      Breath sounds: Normal  breath sounds.   Abdominal:      General: Bowel sounds are normal.      Palpations: Abdomen is soft.   Musculoskeletal:         General: Normal range of motion.      Cervical back: Normal range of motion and neck supple.   Skin:     General: Skin is warm and dry.   Neurological:      Mental Status: He is alert and oriented to person, place, and time.   Psychiatric:         Behavior: Behavior normal.         Urinalysis:  Normal   Bladder scan: 4 cc    Assessment/Plan:    1. BPH with obstruction:  Patient's bladder scan is good to 4 cc.    Will continue Flomax 0.4 mg daily as directed.      2. Erectile dysfunction:  Patient continues to on Cialis 20 mg as needed.    Patient continue as directed.      3. Elevated PSA:  Last PSA 3 months ago was 2.64.    Will recheck at next visit.      4. Urinary urgency: Patient has had no improvement dietary behavior modifications.    Will start patient on oxybutynin XL 15 mg daily.    Discussed possible risk in side effects including dementia.    Patient stated understanding and still would like to try the medication.      Plan follow-up in 3 months for re-evaluation, sooner if needed.  Problem List Items Addressed This Visit        Renal/    Erectile dysfunction      Other Visit Diagnoses     BPH with urinary obstruction    -  Primary    Relevant Orders    POCT Urinalysis (w/Micro Option)    POCT Bladder Scan    Elevated PSA        Urinary urgency        Relevant Medications    oxybutynin (DITROPAN XL) 15 MG TR24    Other Relevant Orders    POCT Urinalysis (w/Micro Option)    POCT Bladder Scan

## 2022-09-26 ENCOUNTER — PATIENT OUTREACH (OUTPATIENT)
Dept: ADMINISTRATIVE | Facility: HOSPITAL | Age: 57
End: 2022-09-26
Payer: MEDICAID

## 2022-09-26 NOTE — PROGRESS NOTES
COLON REPORT: No Colonoscopy results found in [employer], [facility],  or [facility]WHERE.

## 2022-11-16 ENCOUNTER — PATIENT MESSAGE (OUTPATIENT)
Dept: ADMINISTRATIVE | Facility: HOSPITAL | Age: 57
End: 2022-11-16
Payer: MEDICAID

## 2023-05-23 RX ORDER — FINASTERIDE 5 MG/1
5 TABLET, FILM COATED ORAL DAILY
Qty: 30 TABLET | Refills: 6 | Status: SHIPPED | OUTPATIENT
Start: 2023-05-23 | End: 2023-11-22

## 2023-05-23 RX ORDER — TAMSULOSIN HYDROCHLORIDE 0.4 MG/1
0.4 CAPSULE ORAL DAILY
Qty: 30 CAPSULE | Refills: 6 | Status: SHIPPED | OUTPATIENT
Start: 2023-05-23 | End: 2024-03-11 | Stop reason: SDUPTHER

## 2023-05-29 ENCOUNTER — PATIENT MESSAGE (OUTPATIENT)
Dept: ADMINISTRATIVE | Facility: HOSPITAL | Age: 58
End: 2023-05-29
Payer: MEDICAID

## 2023-06-01 ENCOUNTER — OFFICE VISIT (OUTPATIENT)
Dept: UROLOGY | Facility: CLINIC | Age: 58
End: 2023-06-01
Payer: MEDICAID

## 2023-06-01 VITALS — RESPIRATION RATE: 16 BRPM | WEIGHT: 315 LBS | HEIGHT: 71 IN | BODY MASS INDEX: 44.1 KG/M2

## 2023-06-01 DIAGNOSIS — N40.1 BPH WITH URINARY OBSTRUCTION: ICD-10-CM

## 2023-06-01 DIAGNOSIS — R97.20 ELEVATED PSA: Primary | ICD-10-CM

## 2023-06-01 DIAGNOSIS — N13.8 BPH WITH URINARY OBSTRUCTION: ICD-10-CM

## 2023-06-01 DIAGNOSIS — R35.0 URINARY FREQUENCY: ICD-10-CM

## 2023-06-01 LAB
BILIRUBIN, UA POC OHS: NEGATIVE
BLOOD, UA POC OHS: ABNORMAL
CLARITY, UA POC OHS: CLEAR
COLOR, UA POC OHS: YELLOW
GLUCOSE, UA POC OHS: NEGATIVE
KETONES, UA POC OHS: NEGATIVE
LEUKOCYTES, UA POC OHS: NEGATIVE
NITRITE, UA POC OHS: NEGATIVE
PH, UA POC OHS: 6
POC RESIDUAL URINE VOLUME: 24 ML (ref 0–100)
PROTEIN, UA POC OHS: NEGATIVE
PSA, DIAGNOSTIC: 1.29 NG/ML (ref 0.1–4)
SPECIFIC GRAVITY, UA POC OHS: 1.01
UROBILINOGEN, UA POC OHS: 0.2

## 2023-06-01 PROCEDURE — 1160F PR REVIEW ALL MEDS BY PRESCRIBER/CLIN PHARMACIST DOCUMENTED: ICD-10-PCS | Mod: CPTII,S$GLB,, | Performed by: NURSE PRACTITIONER

## 2023-06-01 PROCEDURE — 99214 PR OFFICE/OUTPT VISIT, EST, LEVL IV, 30-39 MIN: ICD-10-PCS | Mod: S$GLB,,, | Performed by: NURSE PRACTITIONER

## 2023-06-01 PROCEDURE — 81003 POCT URINALYSIS(INSTRUMENT): ICD-10-PCS | Mod: QW,S$GLB,, | Performed by: NURSE PRACTITIONER

## 2023-06-01 PROCEDURE — 3008F PR BODY MASS INDEX (BMI) DOCUMENTED: ICD-10-PCS | Mod: CPTII,S$GLB,, | Performed by: NURSE PRACTITIONER

## 2023-06-01 PROCEDURE — 81003 URINALYSIS AUTO W/O SCOPE: CPT | Mod: QW,S$GLB,, | Performed by: NURSE PRACTITIONER

## 2023-06-01 PROCEDURE — 1159F MED LIST DOCD IN RCRD: CPT | Mod: CPTII,S$GLB,, | Performed by: NURSE PRACTITIONER

## 2023-06-01 PROCEDURE — 99214 OFFICE O/P EST MOD 30 MIN: CPT | Mod: S$GLB,,, | Performed by: NURSE PRACTITIONER

## 2023-06-01 PROCEDURE — 1160F RVW MEDS BY RX/DR IN RCRD: CPT | Mod: CPTII,S$GLB,, | Performed by: NURSE PRACTITIONER

## 2023-06-01 PROCEDURE — 51798 US URINE CAPACITY MEASURE: CPT | Mod: S$GLB,,, | Performed by: NURSE PRACTITIONER

## 2023-06-01 PROCEDURE — 1159F PR MEDICATION LIST DOCUMENTED IN MEDICAL RECORD: ICD-10-PCS | Mod: CPTII,S$GLB,, | Performed by: NURSE PRACTITIONER

## 2023-06-01 PROCEDURE — 51798 POCT BLADDER SCAN: ICD-10-PCS | Mod: S$GLB,,, | Performed by: NURSE PRACTITIONER

## 2023-06-01 PROCEDURE — 3008F BODY MASS INDEX DOCD: CPT | Mod: CPTII,S$GLB,, | Performed by: NURSE PRACTITIONER

## 2023-06-01 RX ORDER — DIAZEPAM 2 MG/1
TABLET ORAL
COMMUNITY
Start: 2023-04-06

## 2023-06-01 RX ORDER — BUSPIRONE HYDROCHLORIDE 15 MG/1
TABLET ORAL
COMMUNITY
Start: 2023-05-29

## 2023-06-01 RX ORDER — SEMAGLUTIDE 1.34 MG/ML
INJECTION, SOLUTION SUBCUTANEOUS
COMMUNITY
Start: 2023-03-15

## 2023-06-01 RX ORDER — DULOXETIN HYDROCHLORIDE 30 MG/1
30 CAPSULE, DELAYED RELEASE ORAL 2 TIMES DAILY
COMMUNITY

## 2023-06-01 RX ORDER — TADALAFIL 5 MG/1
TABLET ORAL
COMMUNITY
Start: 2023-02-13

## 2023-06-01 RX ORDER — ARIPIPRAZOLE 30 MG/1
TABLET ORAL
COMMUNITY
Start: 2023-05-27

## 2023-06-01 RX ORDER — SOLIFENACIN SUCCINATE 10 MG/1
10 TABLET, FILM COATED ORAL DAILY
Qty: 30 TABLET | Refills: 11 | Status: SHIPPED | OUTPATIENT
Start: 2023-06-01 | End: 2024-05-31

## 2023-06-01 NOTE — PROGRESS NOTES
Subjective:       Patient ID: Dre Fowler is a 58 y.o. male.    Chief Complaint: Other (Oxybutynin did not work/help)      HPI: 58-year-old male, established patient, last seen 07/25/2022.    Patient has history of an elevated PSA..    Patient is a biopsy with Dr. Brar years ago.  No copy of biopsy but records show a PSA as high as 13.92 in March 2017.    Patient does have a history BPH with obstruction and is maintained on Flomax and Proscar.    Patient did have a CT at [facility] in 2017 showed an enlarged prostate.    Patient is complaining of some frequency.    States he drinks about 2 cups of coffee per day and 2 sodas per day.    Start oxybutynin XL 50 mg daily with no improvement.      Denies any pain or burning urination.  Denies odor urine.  Denies any difficulty voiding.  States he is a pretty good stream.  Denies any unexpected weight loss.  Denies any onset bone pain.    No other urinary complaints this time.       Past Medical History:   Past Medical History:   Diagnosis Date    Anxiety disorder     Bipolar 1 disorder     Depression     Diabetes mellitus     Elevated heart rate with elevated blood pressure and diagnosis of hypertension     GERD (gastroesophageal reflux disease)     Insomnia     Male erectile dysfunction, unspecified     Prostatitis     Sleep apnea, unspecified     Slipped disc in neck     RT - DR STEVENSON       Past Surgical Historical:   Past Surgical History:   Procedure Laterality Date    BACK SURGERY  03/01/2019    lens repair Left     for Glaumona and Implant    NECK SURGERY      UMBILICAL HERNIA REPAIR  11/01/2018    W/ MESH    VASECTOMY          Medications:   Medication List with Changes/Refills   New Medications    SOLIFENACIN (VESICARE) 10 MG TABLET    Take 1 tablet (10 mg total) by mouth once daily.   Current Medications    ARIPIPRAZOLE (ABILIFY) 30 MG TAB        BUSPIRONE (BUSPAR) 15 MG TABLET        CETIRIZINE (ZYRTEC) 10 MG TABLET     Take 1 tablet (10 mg total) by mouth every evening.    CHOLECALCIFEROL, VITAMIN D3, 125 MCG (5,000 UNIT) CAPSULE    Take 5,000 Units by mouth once daily. take with food    CYCLOBENZAPRINE (FLEXERIL) 10 MG TABLET    Take 10 mg by mouth every 8 (eight) hours as needed. FOR MUSCLE SPASM    DIAZEPAM (VALIUM) 2 MG TABLET    TAKE 1 TABLET BY MOUTH ONCE DAILY AS NEEDED FOR ANXIETY    DULOXETINE (CYMBALTA) 30 MG CAPSULE    Take 30 mg by mouth 2 (two) times daily.    DULOXETINE (CYMBALTA) 60 MG CAPSULE    Take 1 capsule (60 mg total) by mouth every evening.    DUREZOL 0.05 % DROP OPHTHALMIC SOLUTION    INSTILL 1 DROP IN OPERATIVE EYE THREE TIMES DAILY FOR 1 DAY PREOP, THEN FOUR TIMES DAILY DAY OF SURGERY, THEN TWICE DAILY FOR 2 WEEKS, THEN DAILY FOR 2 WEEKS    FINASTERIDE (PROSCAR) 5 MG TABLET    Take 1 tablet (5 mg total) by mouth once daily.    GABAPENTIN (NEURONTIN) 600 MG TABLET    Take 600 mg by mouth 3 (three) times daily.    ILEVRO 0.3 % DRPS    INSTILL 1 DROP INTO OPERATIVE EYE FOUR TIMES DAILY FOR 1 DAY PREOP, THE DAY OF SURGERY AND FOR ONE WEEK    METFORMIN (GLUCOPHAGE) 500 MG TABLET    TAKE 1 TABLET BY MOUTH TWICE DAILY FOR 7 DAYS THEN TAKE 2 TABLETS TWICE DAILY    METOPROLOL SUCCINATE (TOPROL-XL) 100 MG 24 HR TABLET    TAKE 1/2 TABLET BY MOUTH EVERY MORNING FOR 4 DAYS, THEN 1 TAB DAILY FOR 4 DAYS, THEN 1.5 TABS DAILY IF NOT ALREADY A LOT BETTER    OZEMPIC 0.25 MG OR 0.5 MG(2 MG/1.5 ML) PEN INJECTOR    INJECT 0.5 MG UNDER THE SKIN ONCE WEEKLY    PANTOPRAZOLE (PROTONIX) 40 MG TABLET    Take 40 mg by mouth once daily.    POLYMYXIN B SULF-TRIMETHOPRIM (POLYTRIM) 10,000 UNIT- 1 MG/ML DROP    APPLY 1 DROP IN OPERATIVE EYE FOUR TIMES DAILY 1 DAY PREOP, THEN FOUR TIMES DAILY DAY OF SURGERY, THEN FOUR TIMES DAILY FOR 1 WEEK    PRAZOSIN (MINIPRESS) 1 MG CAP    TAKE 1 CAPSULE BY MOUTH AT BEDTIME FOR NIGHTMARES    TADALAFIL (CIALIS) 5 MG TABLET    TAKE 1 TABLET BY MOUTH ONCE DAILY AS NEEDED BEFORE SEXUAL ACTIVITY     TAMSULOSIN (FLOMAX) 0.4 MG CAP    Take 1 capsule (0.4 mg total) by mouth once daily.    TRAZODONE (DESYREL) 50 MG TABLET    Take 50 mg by mouth nightly.   Discontinued Medications    ARIPIPRAZOLE (ABILIFY) 20 MG TAB    TAKE 1/2 (ONE-HALF) TABLET BY MOUTH TWICE DAILY AS DIRECTED    BUSPIRONE (BUSPAR) 30 MG TAB    Take 30 mg by mouth 2 (two) times daily.    DIAZEPAM (VALIUM) 5 MG TABLET    Take 1 tablet (5 mg total) by mouth nightly as needed for Insomnia.    OXYBUTYNIN (DITROPAN XL) 15 MG TR24    Take 1 tablet (15 mg total) by mouth once daily.    TADALAFIL (CIALIS) 20 MG TAB            Past Social History:   Social History     Socioeconomic History    Marital status: Single   Tobacco Use    Smoking status: Former     Packs/day: 1.00     Years: 30.00     Pack years: 30.00     Types: Cigarettes    Smokeless tobacco: Never   Substance and Sexual Activity    Alcohol use: Not Currently    Drug use: Never       Allergies: Review of patient's allergies indicates:  No Known Allergies     Family History:   Family History   Problem Relation Age of Onset    Diabetes Mother     Hypertension Mother     Lung cancer Brother         Review of Systems:  Review of Systems   Constitutional:  Negative for activity change and appetite change.   HENT:  Negative for congestion and dental problem.    Eyes:  Negative for visual disturbance.   Respiratory:  Negative for chest tightness and shortness of breath.    Cardiovascular:  Negative for chest pain.   Gastrointestinal:  Negative for abdominal distention and abdominal pain.   Genitourinary:  Positive for frequency. Negative for decreased urine volume, difficulty urinating, dysuria, enuresis, flank pain, genital sores, hematuria, penile discharge, penile pain, penile swelling, scrotal swelling, testicular pain and urgency.   Musculoskeletal:  Negative for back pain and neck pain.   Skin:  Negative for color change.   Neurological:  Negative for dizziness.   Hematological:   Negative for adenopathy.   Psychiatric/Behavioral:  Negative for agitation, behavioral problems and confusion.      Physical Exam:  Physical Exam  Vitals and nursing note reviewed.   Constitutional:       Appearance: He is well-developed.   HENT:      Head: Normocephalic.   Eyes:      Pupils: Pupils are equal, round, and reactive to light.   Cardiovascular:      Rate and Rhythm: Normal rate and regular rhythm.      Heart sounds: Normal heart sounds.   Pulmonary:      Effort: Pulmonary effort is normal.      Breath sounds: Normal breath sounds.   Abdominal:      General: Bowel sounds are normal.      Palpations: Abdomen is soft.   Musculoskeletal:         General: Normal range of motion.      Cervical back: Normal range of motion and neck supple.   Skin:     General: Skin is warm and dry.   Neurological:      Mental Status: He is alert and oriented to person, place, and time.   Psychiatric:         Behavior: Behavior normal.     Urinalysis:  Trace intact blood, red blood cells 0-2.    Bladder scan:  24 cc    Assessment/Plan:   1. Elevated PSA:  Check the patient's PSA.  We will notify him of the results.      2. BPH with obstruction:  Patient's bladder scan is good at 24 cc.    Patient continue Flomax and Proscar as directed.      3. Frequency: Discussed tea, soda, and coffee.    Patient failed oxybutynin.    Will try VESIcare 10 mg daily.      Patient follow-up in 2-3 months for re-evaluation, sooner if needed.    Problem List Items Addressed This Visit    None  Visit Diagnoses       Elevated PSA    -  Primary    Relevant Orders    Prostate Specific Antigen, Diagnostic    BPH with urinary obstruction        Relevant Orders    POCT Bladder Scan (Completed)    POCT Urinalysis(Instrument)    Urinary frequency        Relevant Medications    solifenacin (VESICARE) 10 MG tablet

## 2023-06-01 NOTE — PROGRESS NOTES
Using aseptic measures I attempted to draw PSA to right ac, it rolled. Will get other staff member to try.

## 2023-06-09 ENCOUNTER — TELEPHONE (OUTPATIENT)
Dept: UROLOGY | Facility: CLINIC | Age: 58
End: 2023-06-09
Payer: MEDICAID

## 2023-06-09 NOTE — TELEPHONE ENCOUNTER
----- Message from Giana Perez sent at 6/8/2023  5:30 PM CDT -----  Contact: self    ----- Message -----  From: Jessica Su  Sent: 6/8/2023  10:30 AM CDT  To: Fermin Erickson Staff    Pt is calling for results. Please call pt at [phone].

## 2023-06-14 ENCOUNTER — PATIENT MESSAGE (OUTPATIENT)
Dept: UROLOGY | Facility: CLINIC | Age: 58
End: 2023-06-14
Payer: MEDICAID

## 2023-08-29 ENCOUNTER — OFFICE VISIT (OUTPATIENT)
Dept: UROLOGY | Facility: CLINIC | Age: 58
End: 2023-08-29
Payer: MEDICAID

## 2023-08-29 VITALS — SYSTOLIC BLOOD PRESSURE: 118 MMHG | DIASTOLIC BLOOD PRESSURE: 64 MMHG

## 2023-08-29 DIAGNOSIS — R97.20 ELEVATED PSA: Primary | ICD-10-CM

## 2023-08-29 DIAGNOSIS — N13.8 BPH WITH URINARY OBSTRUCTION: ICD-10-CM

## 2023-08-29 DIAGNOSIS — N40.1 BPH WITH URINARY OBSTRUCTION: ICD-10-CM

## 2023-08-29 DIAGNOSIS — R39.15 URINARY URGENCY: ICD-10-CM

## 2023-08-29 PROCEDURE — 1159F MED LIST DOCD IN RCRD: CPT | Mod: CPTII,S$GLB,, | Performed by: NURSE PRACTITIONER

## 2023-08-29 PROCEDURE — 1160F PR REVIEW ALL MEDS BY PRESCRIBER/CLIN PHARMACIST DOCUMENTED: ICD-10-PCS | Mod: CPTII,S$GLB,, | Performed by: NURSE PRACTITIONER

## 2023-08-29 PROCEDURE — 3078F PR MOST RECENT DIASTOLIC BLOOD PRESSURE < 80 MM HG: ICD-10-PCS | Mod: CPTII,S$GLB,, | Performed by: NURSE PRACTITIONER

## 2023-08-29 PROCEDURE — 3074F PR MOST RECENT SYSTOLIC BLOOD PRESSURE < 130 MM HG: ICD-10-PCS | Mod: CPTII,S$GLB,, | Performed by: NURSE PRACTITIONER

## 2023-08-29 PROCEDURE — 99213 OFFICE O/P EST LOW 20 MIN: CPT | Mod: S$GLB,,, | Performed by: NURSE PRACTITIONER

## 2023-08-29 PROCEDURE — 3074F SYST BP LT 130 MM HG: CPT | Mod: CPTII,S$GLB,, | Performed by: NURSE PRACTITIONER

## 2023-08-29 PROCEDURE — 1159F PR MEDICATION LIST DOCUMENTED IN MEDICAL RECORD: ICD-10-PCS | Mod: CPTII,S$GLB,, | Performed by: NURSE PRACTITIONER

## 2023-08-29 PROCEDURE — 1160F RVW MEDS BY RX/DR IN RCRD: CPT | Mod: CPTII,S$GLB,, | Performed by: NURSE PRACTITIONER

## 2023-08-29 PROCEDURE — 3078F DIAST BP <80 MM HG: CPT | Mod: CPTII,S$GLB,, | Performed by: NURSE PRACTITIONER

## 2023-08-29 PROCEDURE — 99213 PR OFFICE/OUTPT VISIT, EST, LEVL III, 20-29 MIN: ICD-10-PCS | Mod: S$GLB,,, | Performed by: NURSE PRACTITIONER

## 2023-08-29 NOTE — PROGRESS NOTES
How Severe Is Your Skin Lesion?: moderate Subjective:       Patient ID: Dre Fowler is a 58 y.o. male.    Chief Complaint: No chief complaint on file.      HPI: 58-year-old male, established patient, presents for 3 month follow-up.    Patient has a history of an elevated PSA.    PSA in June was 2.5 with conversion for Proscar.      He has a history BPH with obstruction.    At last visit patient was complaining of some urgency.  Bladder scan was good.  We added VESIcare to his regimen.    Patient presents today that his urgency and nocturia has greatly improved.    No other urinary complaints at this time.         Past Medical History:   Past Medical History:   Diagnosis Date    Anxiety disorder     Bipolar 1 disorder     Depression     Diabetes mellitus     Elevated heart rate with elevated blood pressure and diagnosis of hypertension     GERD (gastroesophageal reflux disease)     Insomnia     Male erectile dysfunction, unspecified     Prostatitis     Sleep apnea, unspecified     Slipped disc in neck     RT - DR STEVENSON       Past Surgical Historical:   Past Surgical History:   Procedure Laterality Date    BACK SURGERY  03/01/2019    lens repair Left     for Glaumona and Implant    NECK SURGERY      UMBILICAL HERNIA REPAIR  11/01/2018    W/ MESH    VASECTOMY          Medications:   Medication List with Changes/Refills   Current Medications    ARIPIPRAZOLE (ABILIFY) 30 MG TAB        BUSPIRONE (BUSPAR) 15 MG TABLET        CHOLECALCIFEROL, VITAMIN D3, 125 MCG (5,000 UNIT) CAPSULE    Take 5,000 Units by mouth once daily. take with food    CYCLOBENZAPRINE (FLEXERIL) 10 MG TABLET    Take 10 mg by mouth every 8 (eight) hours as needed. FOR MUSCLE SPASM    DIAZEPAM (VALIUM) 2 MG TABLET    TAKE 1 TABLET BY MOUTH ONCE DAILY AS NEEDED FOR ANXIETY    DULOXETINE (CYMBALTA) 30 MG CAPSULE    Take 30 mg by mouth 2 (two) times daily.    FINASTERIDE (PROSCAR) 5 MG TABLET    Take 1 tablet (5 mg total) by mouth once daily.    GABAPENTIN (NEURONTIN) 600 MG TABLET    Take 600 mg  Is This A New Presentation, Or A Follow-Up?: Skin Lesions by mouth 3 (three) times daily.    METFORMIN (GLUCOPHAGE) 500 MG TABLET    TAKE 1 TABLET BY MOUTH TWICE DAILY FOR 7 DAYS THEN TAKE 2 TABLETS TWICE DAILY    METOPROLOL SUCCINATE (TOPROL-XL) 100 MG 24 HR TABLET    TAKE 1/2 TABLET BY MOUTH EVERY MORNING FOR 4 DAYS, THEN 1 TAB DAILY FOR 4 DAYS, THEN 1.5 TABS DAILY IF NOT ALREADY A LOT BETTER    OZEMPIC 0.25 MG OR 0.5 MG(2 MG/1.5 ML) PEN INJECTOR    INJECT 0.5 MG UNDER THE SKIN ONCE WEEKLY    PANTOPRAZOLE (PROTONIX) 40 MG TABLET    Take 40 mg by mouth once daily.    PRAZOSIN (MINIPRESS) 1 MG CAP    TAKE 1 CAPSULE BY MOUTH AT BEDTIME FOR NIGHTMARES    SOLIFENACIN (VESICARE) 10 MG TABLET    Take 1 tablet (10 mg total) by mouth once daily.    TADALAFIL (CIALIS) 5 MG TABLET    TAKE 1 TABLET BY MOUTH ONCE DAILY AS NEEDED BEFORE SEXUAL ACTIVITY    TAMSULOSIN (FLOMAX) 0.4 MG CAP    Take 1 capsule (0.4 mg total) by mouth once daily.    TRAZODONE (DESYREL) 50 MG TABLET    Take 50 mg by mouth nightly.        Past Social History:   Social History     Socioeconomic History    Marital status: Single   Tobacco Use    Smoking status: Former     Current packs/day: 1.00     Average packs/day: 1 pack/day for 30.0 years (30.0 ttl pk-yrs)     Types: Cigarettes    Smokeless tobacco: Never   Substance and Sexual Activity    Alcohol use: Not Currently    Drug use: Never       Allergies: Review of patient's allergies indicates:  No Known Allergies     Family History:   Family History   Problem Relation Age of Onset    Diabetes Mother     Hypertension Mother     Lung cancer Brother         Review of Systems:  Review of Systems   Constitutional:  Negative for activity change and appetite change.   HENT:  Negative for congestion and dental problem.    Respiratory:  Negative for chest tightness and shortness of breath.    Cardiovascular:  Negative for chest pain.   Gastrointestinal:  Negative for abdominal distention and abdominal pain.   Genitourinary:  Negative for decreased urine volume,  Additional History: Declines full body check because she gets that done with [facility] in [place] every winter. difficulty urinating, dysuria, enuresis, flank pain, frequency, genital sores, hematuria, penile discharge, penile pain, penile swelling, scrotal swelling, testicular pain and urgency.   Musculoskeletal:  Negative for back pain and neck pain.   Neurological:  Negative for dizziness.   Hematological:  Negative for adenopathy.   Psychiatric/Behavioral:  Negative for agitation, behavioral problems and confusion.        Physical Exam:  Physical Exam  Vitals and nursing note reviewed.   Constitutional:       Appearance: He is well-developed.   HENT:      Head: Normocephalic.   Cardiovascular:      Rate and Rhythm: Normal rate and regular rhythm.      Heart sounds: Normal heart sounds.   Pulmonary:      Effort: Pulmonary effort is normal.      Breath sounds: Normal breath sounds.   Abdominal:      General: Bowel sounds are normal.      Palpations: Abdomen is soft.   Skin:     General: Skin is warm and dry.   Neurological:      Mental Status: He is alert and oriented to person, place, and time.       Bladder scan:  6 cc    Assessment/Plan:   1. Elevated PSA:  PSA in June was 2.5.    PSAs have been high as 13.92.      2. BPH with obstruction:  Patient will continue Flomax and Proscar as directed.      3. Urinary urgency:  Patient has had good results with VESIcare 10 mg daily.    Patient continue as directed.      Plan follow-up in 6 months, sooner if needed.    Problem List Items Addressed This Visit          Renal/    Elevated PSA - Primary    Overview     History of elevated PSA with a biopsy by Dr. Brar.  PSA as high as 13.92 in March 2017.          Other Visit Diagnoses       BPH with urinary obstruction        Urinary urgency        Relevant Orders    POCT Bladder Scan

## 2023-09-25 ENCOUNTER — PATIENT MESSAGE (OUTPATIENT)
Dept: ADMINISTRATIVE | Facility: HOSPITAL | Age: 58
End: 2023-09-25
Payer: MEDICAID

## 2023-11-22 DIAGNOSIS — N13.8 BPH WITH URINARY OBSTRUCTION: Primary | ICD-10-CM

## 2023-11-22 DIAGNOSIS — N40.1 BPH WITH URINARY OBSTRUCTION: Primary | ICD-10-CM

## 2023-11-22 RX ORDER — FINASTERIDE 5 MG/1
5 TABLET, FILM COATED ORAL DAILY
Qty: 30 TABLET | Refills: 6 | Status: SHIPPED | OUTPATIENT
Start: 2023-11-22 | End: 2024-03-11 | Stop reason: SDUPTHER

## 2024-01-09 ENCOUNTER — TELEPHONE (OUTPATIENT)
Dept: UROLOGY | Facility: CLINIC | Age: 59
End: 2024-01-09
Payer: MEDICAID

## 2024-03-11 ENCOUNTER — OFFICE VISIT (OUTPATIENT)
Dept: UROLOGY | Facility: CLINIC | Age: 59
End: 2024-03-11
Payer: MEDICAID

## 2024-03-11 VITALS
DIASTOLIC BLOOD PRESSURE: 80 MMHG | SYSTOLIC BLOOD PRESSURE: 136 MMHG | BODY MASS INDEX: 44.1 KG/M2 | WEIGHT: 315 LBS | HEART RATE: 52 BPM | HEIGHT: 71 IN | OXYGEN SATURATION: 95 %

## 2024-03-11 DIAGNOSIS — R39.15 URINARY URGENCY: ICD-10-CM

## 2024-03-11 DIAGNOSIS — N13.8 BPH WITH URINARY OBSTRUCTION: ICD-10-CM

## 2024-03-11 DIAGNOSIS — N40.1 BPH WITH URINARY OBSTRUCTION: ICD-10-CM

## 2024-03-11 DIAGNOSIS — R97.20 ELEVATED PSA: Primary | ICD-10-CM

## 2024-03-11 LAB
BILIRUBIN, UA POC OHS: NEGATIVE
BLOOD, UA POC OHS: ABNORMAL
CLARITY, UA POC OHS: CLEAR
COLOR, UA POC OHS: YELLOW
GLUCOSE, UA POC OHS: NEGATIVE
KETONES, UA POC OHS: NEGATIVE
LEUKOCYTES, UA POC OHS: NEGATIVE
NITRITE, UA POC OHS: NEGATIVE
PH, UA POC OHS: 6
PROTEIN, UA POC OHS: NEGATIVE
SPECIFIC GRAVITY, UA POC OHS: 1.02
UROBILINOGEN, UA POC OHS: 1

## 2024-03-11 PROCEDURE — 1160F RVW MEDS BY RX/DR IN RCRD: CPT | Mod: CPTII,S$GLB,, | Performed by: NURSE PRACTITIONER

## 2024-03-11 PROCEDURE — 3008F BODY MASS INDEX DOCD: CPT | Mod: CPTII,S$GLB,, | Performed by: NURSE PRACTITIONER

## 2024-03-11 PROCEDURE — 81003 URINALYSIS AUTO W/O SCOPE: CPT | Mod: QW,S$GLB,, | Performed by: NURSE PRACTITIONER

## 2024-03-11 PROCEDURE — 3079F DIAST BP 80-89 MM HG: CPT | Mod: CPTII,S$GLB,, | Performed by: NURSE PRACTITIONER

## 2024-03-11 PROCEDURE — 1159F MED LIST DOCD IN RCRD: CPT | Mod: CPTII,S$GLB,, | Performed by: NURSE PRACTITIONER

## 2024-03-11 PROCEDURE — 99214 OFFICE O/P EST MOD 30 MIN: CPT | Mod: S$GLB,,, | Performed by: NURSE PRACTITIONER

## 2024-03-11 PROCEDURE — 3075F SYST BP GE 130 - 139MM HG: CPT | Mod: CPTII,S$GLB,, | Performed by: NURSE PRACTITIONER

## 2024-03-11 RX ORDER — FINASTERIDE 5 MG/1
5 TABLET, FILM COATED ORAL DAILY
Qty: 30 TABLET | Refills: 6 | Status: SHIPPED | OUTPATIENT
Start: 2024-03-11 | End: 2025-03-11

## 2024-03-11 RX ORDER — TAMSULOSIN HYDROCHLORIDE 0.4 MG/1
0.4 CAPSULE ORAL DAILY
Qty: 30 CAPSULE | Refills: 6 | Status: SHIPPED | OUTPATIENT
Start: 2024-03-11

## 2024-03-11 NOTE — PROGRESS NOTES
Subjective:       Patient ID: Dre Fowler is a 58 y.o. male.    Chief Complaint: No chief complaint on file.      HPI: 58-year-old male, established patient, presents for 6 month visit.    Patient has history of BPH with obstruction.  Patient is maintained on Flomax 0.4 mg daily and Proscar 5 mg daily.    Patient has history urgency.  He had success with VESIcare 10 mg daily.    This time patient states he is doing well.  Denies any pain or burning urination.  Denies any odor to the urine.  Denies any fever or body aches.  Denies any blood in urine.  Denies any significant frequency, urgency, or nocturia.  Denies having strain to void.    Patient has history of an elevated PSA.  He has had a PSA as high as 13.    He had a negative biopsy in the past.    Last PSA in June 2023 was 1.29.  With Proscar conversion PSA was 2.5.    No urinary complaints at this time.         Past Medical History:   Past Medical History:   Diagnosis Date    Anxiety disorder     Bipolar 1 disorder     Depression     Diabetes mellitus     Elevated heart rate with elevated blood pressure and diagnosis of hypertension     GERD (gastroesophageal reflux disease)     Insomnia     Male erectile dysfunction, unspecified     Prostatitis     Sleep apnea, unspecified     Slipped disc in neck     RT - DR STEVENSON       Past Surgical Historical:   Past Surgical History:   Procedure Laterality Date    BACK SURGERY  03/01/2019    lens repair Left     for Glaumona and Implant    NECK SURGERY      UMBILICAL HERNIA REPAIR  11/01/2018    W/ MESH    VASECTOMY          Medications:   Medication List with Changes/Refills   Current Medications    ARIPIPRAZOLE (ABILIFY) 30 MG TAB        BUSPIRONE (BUSPAR) 15 MG TABLET        CHOLECALCIFEROL, VITAMIN D3, 125 MCG (5,000 UNIT) CAPSULE    Take 5,000 Units by mouth once daily. take with food    CYCLOBENZAPRINE (FLEXERIL) 10 MG TABLET    Take 10 mg by mouth every 8 (eight) hours as needed. FOR MUSCLE SPASM    DIAZEPAM  (VALIUM) 2 MG TABLET    TAKE 1 TABLET BY MOUTH ONCE DAILY AS NEEDED FOR ANXIETY    DULOXETINE (CYMBALTA) 30 MG CAPSULE    Take 30 mg by mouth 2 (two) times daily.    GABAPENTIN (NEURONTIN) 600 MG TABLET    Take 600 mg by mouth 3 (three) times daily.    METFORMIN (GLUCOPHAGE) 500 MG TABLET    TAKE 1 TABLET BY MOUTH TWICE DAILY FOR 7 DAYS THEN TAKE 2 TABLETS TWICE DAILY    METOPROLOL SUCCINATE (TOPROL-XL) 100 MG 24 HR TABLET    TAKE 1/2 TABLET BY MOUTH EVERY MORNING FOR 4 DAYS, THEN 1 TAB DAILY FOR 4 DAYS, THEN 1.5 TABS DAILY IF NOT ALREADY A LOT BETTER    OZEMPIC 0.25 MG OR 0.5 MG(2 MG/1.5 ML) PEN INJECTOR    INJECT 0.5 MG UNDER THE SKIN ONCE WEEKLY    PANTOPRAZOLE (PROTONIX) 40 MG TABLET    Take 40 mg by mouth once daily.    PRAZOSIN (MINIPRESS) 1 MG CAP    TAKE 1 CAPSULE BY MOUTH AT BEDTIME FOR NIGHTMARES    SOLIFENACIN (VESICARE) 10 MG TABLET    Take 1 tablet (10 mg total) by mouth once daily.    TADALAFIL (CIALIS) 5 MG TABLET    TAKE 1 TABLET BY MOUTH ONCE DAILY AS NEEDED BEFORE SEXUAL ACTIVITY    TRAZODONE (DESYREL) 50 MG TABLET    Take 50 mg by mouth nightly.   Changed and/or Refilled Medications    Modified Medication Previous Medication    FINASTERIDE (PROSCAR) 5 MG TABLET finasteride (PROSCAR) 5 mg tablet       Take 1 tablet (5 mg total) by mouth once daily.    Take 1 tablet (5 mg total) by mouth once daily.    TAMSULOSIN (FLOMAX) 0.4 MG CAP tamsulosin (FLOMAX) 0.4 mg Cap       Take 1 capsule (0.4 mg total) by mouth once daily.    Take 1 capsule (0.4 mg total) by mouth once daily.        Past Social History:   Social History     Socioeconomic History    Marital status: Single   Tobacco Use    Smoking status: Former     Current packs/day: 1.00     Average packs/day: 1 pack/day for 30.0 years (30.0 ttl pk-yrs)     Types: Cigarettes    Smokeless tobacco: Never   Substance and Sexual Activity    Alcohol use: Not Currently    Drug use: Never       Allergies: Review of patient's allergies indicates:  No Known  Allergies     Family History:   Family History   Problem Relation Age of Onset    Diabetes Mother     Hypertension Mother     Lung cancer Brother         Review of Systems:  Review of Systems   Constitutional:  Negative for activity change and appetite change.   HENT:  Negative for congestion and dental problem.    Eyes:  Negative for visual disturbance.   Respiratory:  Negative for chest tightness and shortness of breath.    Cardiovascular:  Negative for chest pain.   Gastrointestinal:  Negative for abdominal distention and abdominal pain.   Genitourinary:  Negative for decreased urine volume, difficulty urinating, dysuria, enuresis, flank pain, frequency, genital sores, hematuria, penile discharge, penile pain, penile swelling, scrotal swelling, testicular pain and urgency.   Musculoskeletal:  Negative for back pain and neck pain.   Skin:  Negative for color change.   Neurological:  Negative for dizziness.   Hematological:  Negative for adenopathy.   Psychiatric/Behavioral:  Negative for agitation, behavioral problems and confusion.        Physical Exam:  Physical Exam  Vitals and nursing note reviewed.   Constitutional:       Appearance: He is well-developed.   HENT:      Head: Normocephalic.   Eyes:      Pupils: Pupils are equal, round, and reactive to light.   Cardiovascular:      Rate and Rhythm: Normal rate and regular rhythm.      Heart sounds: Normal heart sounds.   Pulmonary:      Effort: Pulmonary effort is normal.      Breath sounds: Normal breath sounds.   Abdominal:      General: Bowel sounds are normal.      Palpations: Abdomen is soft.   Musculoskeletal:         General: Normal range of motion.      Cervical back: Normal range of motion and neck supple.   Skin:     General: Skin is warm and dry.   Neurological:      Mental Status: He is alert and oriented to person, place, and time.   Psychiatric:         Behavior: Behavior normal.       Urinalysis: Trace intact blood, red blood cells 0-2.    Bladder  scan:  38 cc    Assessment/Plan:   1. BPH with obstruction:  Patient is doing Flomax 0.4 mg daily and Proscar 5 mg daily.    Patient continue as directed.  Refill sent to pharmacy.      2. Elevated PSA:  Check the patient's PSA.  We will notify him of the results.      3. Urinary urgency:  Patient is doing well on VESIcare 10 mg daily.    Patient follow-up in 6 months, sooner if needed.    Problem List Items Addressed This Visit          Renal/    Elevated PSA - Primary    Overview     History of elevated PSA with a biopsy by Dr. Brar.  PSA as high as 13.92 in March 2017.         Relevant Orders    Prostate Specific Antigen, Diagnostic     Other Visit Diagnoses       BPH with urinary obstruction        Relevant Medications    tamsulosin (FLOMAX) 0.4 mg Cap    finasteride (PROSCAR) 5 mg tablet    Other Relevant Orders    POCT Urinalysis(Instrument)    Prostate Specific Antigen, Diagnostic    POCT Bladder Scan    Urinary urgency

## 2024-03-12 LAB — PSA, DIAGNOSTIC: 1.1 NG/ML (ref 0.1–4)

## 2024-05-09 DIAGNOSIS — R35.0 URINARY FREQUENCY: ICD-10-CM

## 2024-05-10 RX ORDER — SOLIFENACIN SUCCINATE 10 MG/1
10 TABLET, FILM COATED ORAL
Qty: 30 TABLET | Refills: 0 | Status: SHIPPED | OUTPATIENT
Start: 2024-05-10 | End: 2024-06-10

## 2024-06-10 DIAGNOSIS — R35.0 URINARY FREQUENCY: ICD-10-CM

## 2024-06-10 RX ORDER — SOLIFENACIN SUCCINATE 10 MG/1
10 TABLET, FILM COATED ORAL
Qty: 30 TABLET | Refills: 0 | Status: SHIPPED | OUTPATIENT
Start: 2024-06-10

## 2024-07-09 DIAGNOSIS — R35.0 URINARY FREQUENCY: ICD-10-CM

## 2024-07-09 RX ORDER — SOLIFENACIN SUCCINATE 10 MG/1
10 TABLET, FILM COATED ORAL
Qty: 30 TABLET | Refills: 5 | Status: SHIPPED | OUTPATIENT
Start: 2024-07-09

## 2024-10-08 DIAGNOSIS — N13.8 BPH WITH URINARY OBSTRUCTION: ICD-10-CM

## 2024-10-08 DIAGNOSIS — N40.1 BPH WITH URINARY OBSTRUCTION: ICD-10-CM

## 2024-10-08 RX ORDER — TAMSULOSIN HYDROCHLORIDE 0.4 MG/1
1 CAPSULE ORAL
Qty: 30 CAPSULE | Refills: 5 | Status: SHIPPED | OUTPATIENT
Start: 2024-10-08

## 2024-11-14 ENCOUNTER — OFFICE VISIT (OUTPATIENT)
Dept: UROLOGY | Facility: CLINIC | Age: 59
End: 2024-11-14
Payer: MEDICAID

## 2024-11-14 VITALS
SYSTOLIC BLOOD PRESSURE: 95 MMHG | HEART RATE: 56 BPM | BODY MASS INDEX: 42.98 KG/M2 | DIASTOLIC BLOOD PRESSURE: 66 MMHG | HEIGHT: 71 IN | WEIGHT: 307 LBS

## 2024-11-14 DIAGNOSIS — N40.1 BENIGN PROSTATIC HYPERPLASIA WITH URINARY OBSTRUCTION: Primary | ICD-10-CM

## 2024-11-14 DIAGNOSIS — N13.8 BENIGN PROSTATIC HYPERPLASIA WITH URINARY OBSTRUCTION: Primary | ICD-10-CM

## 2024-11-14 DIAGNOSIS — N32.81 OAB (OVERACTIVE BLADDER): ICD-10-CM

## 2024-11-14 LAB — PSA, DIAGNOSTIC: 1.13 NG/ML (ref 0.1–4)

## 2024-11-14 RX ORDER — MIRABEGRON 50 MG/1
50 TABLET, FILM COATED, EXTENDED RELEASE ORAL DAILY
Qty: 30 TABLET | Refills: 11 | Status: SHIPPED | OUTPATIENT
Start: 2024-11-14 | End: 2025-11-14

## 2024-11-14 NOTE — PROGRESS NOTES
Subjective:       Patient ID: Dre Fowler is a 59 y.o. male.    Chief Complaint: Elevated PSA      HPI: 59-year-old male, established patient, presents for 6 month visit.    Patient has history of BPH with obstruction.  Patient is maintained on Flomax 0.4 mg daily and Proscar 5 mg daily.    Patient has history urgency.  He had success with VESIcare 10 mg daily until the last 3-4 months.  He reports that he is experiencing urinary frequency and urgency with occasional urge incontinence.  He also reports that he has had an increase in lower abdominal pressure with some intermittent bladder spasms.  He also reports today some mild postvoid dribbling.  This time patient states he is doing well.  Denies any pain or burning urination.  Denies any odor to the urine.  Denies any fever or body aches.  Denies any blood in urine.   Denies having strain to void.     Patient has history of an elevated PSA.  He has had a PSA as high as 13.    He had a negative biopsy in the past.    Last PSA in May 2024 was 1.10.  With Proscar conversion PSA was 2.2.              Past Medical History:   Past Medical History:   Diagnosis Date    Anxiety disorder     Bipolar 1 disorder     Depression     Diabetes mellitus     Elevated heart rate with elevated blood pressure and diagnosis of hypertension     GERD (gastroesophageal reflux disease)     Insomnia     Male erectile dysfunction, unspecified     Prostatitis     Sleep apnea, unspecified     Slipped disc in neck     RT - DR STEVENSON       Past Surgical Historical:   Past Surgical History:   Procedure Laterality Date    BACK SURGERY  03/01/2019    lens repair Left     for Glaumona and Implant    NECK SURGERY      UMBILICAL HERNIA REPAIR  11/01/2018    W/ MESH    VASECTOMY          Medications:   Medication List with Changes/Refills   New Medications    MIRABEGRON (MYRBETRIQ) 50 MG TB24    Take 1 tablet (50 mg total) by mouth once daily.   Current Medications    ARIPIPRAZOLE (ABILIFY) 30 MG TAB         BUSPIRONE (BUSPAR) 15 MG TABLET        CHOLECALCIFEROL, VITAMIN D3, 125 MCG (5,000 UNIT) CAPSULE    Take 5,000 Units by mouth once daily. take with food    CYCLOBENZAPRINE (FLEXERIL) 10 MG TABLET    Take 10 mg by mouth every 8 (eight) hours as needed. FOR MUSCLE SPASM    DIAZEPAM (VALIUM) 2 MG TABLET    TAKE 1 TABLET BY MOUTH ONCE DAILY AS NEEDED FOR ANXIETY    DULOXETINE (CYMBALTA) 30 MG CAPSULE    Take 30 mg by mouth 2 (two) times daily.    FINASTERIDE (PROSCAR) 5 MG TABLET    Take 1 tablet (5 mg total) by mouth once daily.    GABAPENTIN (NEURONTIN) 600 MG TABLET    Take 600 mg by mouth 3 (three) times daily.    METFORMIN (GLUCOPHAGE) 500 MG TABLET    TAKE 1 TABLET BY MOUTH TWICE DAILY FOR 7 DAYS THEN TAKE 2 TABLETS TWICE DAILY    METOPROLOL SUCCINATE (TOPROL-XL) 100 MG 24 HR TABLET    TAKE 1/2 TABLET BY MOUTH EVERY MORNING FOR 4 DAYS, THEN 1 TAB DAILY FOR 4 DAYS, THEN 1.5 TABS DAILY IF NOT ALREADY A LOT BETTER    OZEMPIC 0.25 MG OR 0.5 MG(2 MG/1.5 ML) PEN INJECTOR    INJECT 0.5 MG UNDER THE SKIN ONCE WEEKLY    PANTOPRAZOLE (PROTONIX) 40 MG TABLET    Take 40 mg by mouth once daily.    PRAZOSIN (MINIPRESS) 1 MG CAP    TAKE 1 CAPSULE BY MOUTH AT BEDTIME FOR NIGHTMARES    SOLIFENACIN (VESICARE) 10 MG TABLET    Take 1 tablet by mouth once daily    TADALAFIL (CIALIS) 5 MG TABLET    TAKE 1 TABLET BY MOUTH ONCE DAILY AS NEEDED BEFORE SEXUAL ACTIVITY    TAMSULOSIN (FLOMAX) 0.4 MG CAP    Take 1 capsule by mouth once daily    TRAZODONE (DESYREL) 50 MG TABLET    Take 50 mg by mouth nightly.        Past Social History:   Social History     Socioeconomic History    Marital status: Single   Tobacco Use    Smoking status: Former     Current packs/day: 1.00     Average packs/day: 1 pack/day for 30.0 years (30.0 ttl pk-yrs)     Types: Cigarettes    Smokeless tobacco: Never   Substance and Sexual Activity    Alcohol use: Not Currently    Drug use: Never       Allergies: Review of patient's allergies indicates:  No Known  Allergies     Family History:   Family History   Problem Relation Name Age of Onset    Diabetes Mother      Hypertension Mother      Lung cancer Brother          Review of Systems:  Review of Systems   Constitutional: Negative.    HENT: Negative.     Eyes: Negative.    Respiratory: Negative.     Cardiovascular: Negative.    Gastrointestinal: Negative.    Endocrine: Negative.    Genitourinary:  Positive for frequency and urgency.   Musculoskeletal: Negative.    Skin: Negative.    Allergic/Immunologic: Negative.    Neurological: Negative.    Hematological: Negative.    Psychiatric/Behavioral: Negative.       Physical Exam:  Physical Exam  Constitutional:       Appearance: Normal appearance.   Cardiovascular:      Rate and Rhythm: Normal rate.   Pulmonary:      Effort: Pulmonary effort is normal.   Abdominal:      General: Bowel sounds are normal.      Palpations: Abdomen is soft.   Genitourinary:     Comments: Deferred PREMA  Neurological:      Mental Status: He is alert and oriented to person, place, and time.   PVR 6 mL  Assessment/Plan:     BPH with obstruction/history elevated PSA:  We will draw patient's PSA level and call him with results.  Continue Flomax and finasteride as previously prescribed.    Overactive bladder:  Patient has recurrence of urinary frequency and urgency despite VESIcare.  We will stop VESIcare and start him on Myrbetriq 50 mg daily.  Discussed effects of caffeine, tea, sodas, and alcohol on OAB symptoms. Instructed patient to decrease consumption of these fluids and to stop oral fluids approx 4 hours prior to bedtime to decrease night time symptoms. Education provided on bladder training, bladder control strategies, and PFT. Patient verbalized understanding.    Follow up in 1 month for overactive bladder and 6 months for BPH   Problem List Items Addressed This Visit    None  Visit Diagnoses       Benign prostatic hyperplasia with urinary obstruction    -  Primary    Relevant Orders     Prostate Specific Antigen, Diagnostic    OAB (overactive bladder)        Relevant Orders    POCT Bladder Scan

## 2024-11-15 ENCOUNTER — TELEPHONE (OUTPATIENT)
Dept: UROLOGY | Facility: CLINIC | Age: 59
End: 2024-11-15
Payer: MEDICAID

## 2024-11-15 NOTE — TELEPHONE ENCOUNTER
Notified patient that PSA level was 1.13 which is WNL & to follow up for next scheduled appointment on 12/16/2024. Verbalized understanding.               ----- Message from Lidia Rojas NP sent at 11/15/2024  8:05 AM CST -----  Please call the patient and inform of normal test result. No further actions need. Follow up at next scheduled visit or as needed

## 2024-12-16 ENCOUNTER — OFFICE VISIT (OUTPATIENT)
Dept: UROLOGY | Facility: CLINIC | Age: 59
End: 2024-12-16
Payer: MEDICAID

## 2024-12-16 VITALS
HEART RATE: 52 BPM | BODY MASS INDEX: 42.98 KG/M2 | SYSTOLIC BLOOD PRESSURE: 128 MMHG | OXYGEN SATURATION: 96 % | HEIGHT: 71 IN | DIASTOLIC BLOOD PRESSURE: 80 MMHG | WEIGHT: 307 LBS

## 2024-12-16 DIAGNOSIS — N32.81 OAB (OVERACTIVE BLADDER): Primary | ICD-10-CM

## 2024-12-16 DIAGNOSIS — N40.1 BPH WITH URINARY OBSTRUCTION: ICD-10-CM

## 2024-12-16 DIAGNOSIS — N13.8 BPH WITH URINARY OBSTRUCTION: ICD-10-CM

## 2024-12-16 PROCEDURE — 99214 OFFICE O/P EST MOD 30 MIN: CPT | Mod: S$PBB,,, | Performed by: FAMILY MEDICINE

## 2024-12-16 PROCEDURE — 3074F SYST BP LT 130 MM HG: CPT | Mod: CPTII,,, | Performed by: FAMILY MEDICINE

## 2024-12-16 PROCEDURE — 3008F BODY MASS INDEX DOCD: CPT | Mod: CPTII,,, | Performed by: FAMILY MEDICINE

## 2024-12-16 PROCEDURE — 1159F MED LIST DOCD IN RCRD: CPT | Mod: CPTII,,, | Performed by: FAMILY MEDICINE

## 2024-12-16 PROCEDURE — 3079F DIAST BP 80-89 MM HG: CPT | Mod: CPTII,,, | Performed by: FAMILY MEDICINE

## 2024-12-16 RX ORDER — IPRATROPIUM BROMIDE 42 UG/1
SPRAY, METERED NASAL
COMMUNITY

## 2024-12-16 RX ORDER — FINASTERIDE 5 MG/1
5 TABLET, FILM COATED ORAL DAILY
Qty: 30 TABLET | Refills: 6 | Status: SHIPPED | OUTPATIENT
Start: 2024-12-16 | End: 2025-12-16

## 2025-01-01 DIAGNOSIS — R35.0 URINARY FREQUENCY: ICD-10-CM

## 2025-01-02 RX ORDER — SOLIFENACIN SUCCINATE 10 MG/1
10 TABLET, FILM COATED ORAL
Qty: 30 TABLET | Refills: 4 | Status: SHIPPED | OUTPATIENT
Start: 2025-01-02

## 2025-03-20 ENCOUNTER — TELEPHONE (OUTPATIENT)
Dept: UROLOGY | Facility: CLINIC | Age: 60
End: 2025-03-20
Payer: MEDICAID

## 2025-03-20 NOTE — TELEPHONE ENCOUNTER
Spoke with pt about insurance faxing us duplicate meds. Solifenacin and Mirabegron. Pt states he only takes mirabegron.    I canceled the solifenacin if ok with you.

## 2025-03-30 DIAGNOSIS — N13.8 BPH WITH URINARY OBSTRUCTION: ICD-10-CM

## 2025-03-30 DIAGNOSIS — N40.1 BPH WITH URINARY OBSTRUCTION: ICD-10-CM

## 2025-03-31 RX ORDER — TAMSULOSIN HYDROCHLORIDE 0.4 MG/1
1 CAPSULE ORAL
Qty: 30 CAPSULE | Refills: 0 | Status: SHIPPED | OUTPATIENT
Start: 2025-03-31

## 2025-04-29 DIAGNOSIS — N40.1 BPH WITH URINARY OBSTRUCTION: ICD-10-CM

## 2025-04-29 DIAGNOSIS — N13.8 BPH WITH URINARY OBSTRUCTION: ICD-10-CM

## 2025-04-29 RX ORDER — TAMSULOSIN HYDROCHLORIDE 0.4 MG/1
1 CAPSULE ORAL DAILY
Qty: 30 CAPSULE | Refills: 1 | Status: SHIPPED | OUTPATIENT
Start: 2025-04-29

## 2025-06-16 ENCOUNTER — TELEPHONE (OUTPATIENT)
Dept: UROLOGY | Facility: CLINIC | Age: 60
End: 2025-06-16

## 2025-06-16 ENCOUNTER — CLINICAL SUPPORT (OUTPATIENT)
Dept: UROLOGY | Facility: CLINIC | Age: 60
End: 2025-06-16
Payer: MEDICAID

## 2025-06-16 ENCOUNTER — RESULTS FOLLOW-UP (OUTPATIENT)
Dept: UROLOGY | Facility: CLINIC | Age: 60
End: 2025-06-16

## 2025-06-16 DIAGNOSIS — N13.8 BPH WITH URINARY OBSTRUCTION: ICD-10-CM

## 2025-06-16 DIAGNOSIS — N40.1 BPH WITH URINARY OBSTRUCTION: ICD-10-CM

## 2025-06-16 DIAGNOSIS — N13.8 BENIGN PROSTATIC HYPERPLASIA WITH URINARY OBSTRUCTION: Primary | ICD-10-CM

## 2025-06-16 DIAGNOSIS — N40.1 BENIGN PROSTATIC HYPERPLASIA WITH URINARY OBSTRUCTION: Primary | ICD-10-CM

## 2025-06-16 LAB — PSA, DIAGNOSTIC: 1.31 NG/ML (ref 0–4)

## 2025-06-16 NOTE — PROGRESS NOTES
Using aseptic technique, straight stick to right a/c x1 attempt with success. 1x1 gauze with bandage applied. Tolerated well.MELPN

## 2025-06-17 ENCOUNTER — TELEPHONE (OUTPATIENT)
Dept: UROLOGY | Facility: CLINIC | Age: 60
End: 2025-06-17
Payer: MEDICAID

## 2025-06-17 NOTE — PROGRESS NOTES
Current Rx Instructions for Day of Surgery[1]                    NPO Instructions:    Do not eat any food after midnight the night before your surgery/procedure.  You may have clear liquids until TWO hours before surgery/procedure. This includes water, black tea/coffee, (no milk or cream) apple juice and electrolyte drinks (Gatorade).    Additional Instructions:     Will need  home, will receive call day before surgery with arrival time                                                     [1]   No outpatient medications have been marked as taking for the 6/19/25 encounter (Hospital Encounter).      Subjective:       Patient ID: Dre Fowler is a 59 y.o. male.    Chief Complaint: No chief complaint on file.      HPI: 59-year-old male, established patient, presents for one month follow up.    Patient has history of BPH with obstruction.  Patient is maintained on Flomax 0.4 mg daily and Proscar 5 mg daily.    Patient has history urgency.  He had success with VESIcare 10 mg daily until the last 3-4 months.  He was still experiencing urinary frequency and urgency with occasional urge incontinence and some mild postvoid dribbling.  At his last visit he was started on Myrbetriq 50 mg and has seen improvement in his symptoms as long as he does not over drink fluids.  This time patient states he is doing well.  Denies any pain or burning urination.  Denies any odor to the urine.  Denies any fever or body aches.  Denies any blood in urine.   Denies having strain to void.     Patient has history of an elevated PSA.  He has had a PSA as high as 13.    He had a negative biopsy in the past.    Last PSA in May 2024 was 1.10.  With Proscar conversion PSA was 2.2.         Past Medical History:   Past Medical History:   Diagnosis Date    Anxiety disorder     Bipolar 1 disorder     Depression     Diabetes mellitus     Elevated heart rate with elevated blood pressure and diagnosis of hypertension     GERD (gastroesophageal reflux disease)     Insomnia     Male erectile dysfunction, unspecified     Prostatitis     Sleep apnea, unspecified     Slipped disc in neck     RT - DR STEVENSON       Past Surgical Historical:   Past Surgical History:   Procedure Laterality Date    BACK SURGERY  03/01/2019    lens repair Left     for Glaumona and Implant    NECK SURGERY      UMBILICAL HERNIA REPAIR  11/01/2018    W/ MESH    VASECTOMY          Medications:   Medication List with Changes/Refills   Current Medications    ARIPIPRAZOLE (ABILIFY) 30 MG TAB        BUSPIRONE (BUSPAR) 15 MG TABLET        CHOLECALCIFEROL, VITAMIN D3, 125 MCG (5,000 UNIT)  CAPSULE    Take 5,000 Units by mouth once daily. take with food    CYCLOBENZAPRINE (FLEXERIL) 10 MG TABLET    Take 10 mg by mouth every 8 (eight) hours as needed. FOR MUSCLE SPASM    DIAZEPAM (VALIUM) 2 MG TABLET        DULOXETINE (CYMBALTA) 30 MG CAPSULE    Take 30 mg by mouth 2 (two) times daily.    FINASTERIDE (PROSCAR) 5 MG TABLET    Take 1 tablet (5 mg total) by mouth once daily.    GABAPENTIN (NEURONTIN) 600 MG TABLET    Take 600 mg by mouth 3 (three) times daily.    IPRATROPIUM (ATROVENT) 42 MCG (0.06 %) NASAL SPRAY    use 2 sprays in each nostril up to tid for congestion J31.9    METFORMIN (GLUCOPHAGE) 500 MG TABLET        METOPROLOL SUCCINATE (TOPROL-XL) 100 MG 24 HR TABLET    TAKE 1/2 TABLET BY MOUTH EVERY MORNING FOR 4 DAYS, THEN 1 TAB DAILY FOR 4 DAYS, THEN 1.5 TABS DAILY IF NOT ALREADY A LOT BETTER    MIRABEGRON (MYRBETRIQ) 50 MG TB24    Take 1 tablet (50 mg total) by mouth once daily.    OZEMPIC 0.25 MG OR 0.5 MG(2 MG/1.5 ML) PEN INJECTOR        PANTOPRAZOLE (PROTONIX) 40 MG TABLET    Take 40 mg by mouth once daily.    PRAZOSIN (MINIPRESS) 1 MG CAP    TAKE 1 CAPSULE BY MOUTH AT BEDTIME FOR NIGHTMARES    SOLIFENACIN (VESICARE) 10 MG TABLET    Take 1 tablet by mouth once daily    TADALAFIL (CIALIS) 5 MG TABLET    TAKE 1 TABLET BY MOUTH ONCE DAILY AS NEEDED BEFORE SEXUAL ACTIVITY    TAMSULOSIN (FLOMAX) 0.4 MG CAP    Take 1 capsule by mouth once daily    TRAZODONE (DESYREL) 50 MG TABLET    Take 50 mg by mouth nightly.        Past Social History:   Social History     Socioeconomic History    Marital status: Single   Tobacco Use    Smoking status: Former     Current packs/day: 1.00     Average packs/day: 1 pack/day for 30.0 years (30.0 ttl pk-yrs)     Types: Cigarettes    Smokeless tobacco: Never   Substance and Sexual Activity    Alcohol use: Not Currently    Drug use: Never       Allergies: Review of patient's allergies indicates:  No Known Allergies     Family History:   Family History   Problem Relation  Name Age of Onset    Diabetes Mother      Hypertension Mother      Lung cancer Brother          Review of Systems:  Review of Systems   Constitutional: Negative.    HENT: Negative.     Eyes: Negative.    Respiratory: Negative.     Cardiovascular: Negative.    Gastrointestinal: Negative.    Endocrine: Negative.    Genitourinary:  Positive for frequency and urgency.   Musculoskeletal: Negative.    Skin: Negative.    Allergic/Immunologic: Negative.    Neurological: Negative.    Hematological: Negative.    Psychiatric/Behavioral: Negative.         Physical Exam:  Physical Exam  Constitutional:       Appearance: Normal appearance.   Cardiovascular:      Rate and Rhythm: Normal rate.   Pulmonary:      Effort: Pulmonary effort is normal.   Abdominal:      General: Bowel sounds are normal.      Palpations: Abdomen is soft.   Genitourinary:     Comments: Deferred PREMA  Neurological:      Mental Status: He is alert and oriented to person, place, and time.     PVR 4 mL  Assessment/Plan:     BPH with obstruction/history elevated PSA:  We will draw patient's PSA level and call him with results.  Continue Flomax and finasteride as previously prescribed.  Patient will return to clinic in 6 months for PSA.    Overactive bladder:  Continue Myrbetriq 50 mg daily.  Discussed effects of caffeine, tea, sodas, and alcohol on OAB symptoms. Instructed patient to decrease consumption of these fluids and to stop oral fluids approx 4 hours prior to bedtime to decrease night time symptoms. Education provided on bladder training, bladder control strategies, and PFT. Patient verbalized understanding.    Follow up in 6 months with a PSA.  Problem List Items Addressed This Visit    None  Visit Diagnoses       OAB (overactive bladder)    -  Primary    Relevant Orders    POCT Bladder Scan

## 2025-07-17 ENCOUNTER — TELEPHONE (OUTPATIENT)
Dept: UROLOGY | Facility: CLINIC | Age: 60
End: 2025-07-17
Payer: MEDICAID

## 2025-07-18 ENCOUNTER — OFFICE VISIT (OUTPATIENT)
Dept: UROLOGY | Facility: CLINIC | Age: 60
End: 2025-07-18
Payer: MEDICAID

## 2025-07-18 VITALS
BODY MASS INDEX: 42.98 KG/M2 | SYSTOLIC BLOOD PRESSURE: 112 MMHG | HEART RATE: 54 BPM | DIASTOLIC BLOOD PRESSURE: 67 MMHG | HEIGHT: 71 IN | WEIGHT: 307 LBS

## 2025-07-18 DIAGNOSIS — N40.1 BENIGN PROSTATIC HYPERPLASIA WITH URINARY OBSTRUCTION: ICD-10-CM

## 2025-07-18 DIAGNOSIS — R97.20 ELEVATED PSA: Primary | ICD-10-CM

## 2025-07-18 DIAGNOSIS — N52.9 ERECTILE DYSFUNCTION, UNSPECIFIED ERECTILE DYSFUNCTION TYPE: ICD-10-CM

## 2025-07-18 DIAGNOSIS — N13.8 BENIGN PROSTATIC HYPERPLASIA WITH URINARY OBSTRUCTION: ICD-10-CM

## 2025-07-18 RX ORDER — DULOXETIN HYDROCHLORIDE 60 MG/1
CAPSULE, DELAYED RELEASE ORAL
COMMUNITY

## 2025-07-18 RX ORDER — SERTRALINE HYDROCHLORIDE 50 MG/1
50 TABLET, FILM COATED ORAL EVERY MORNING
COMMUNITY
Start: 2025-05-01

## 2025-07-18 RX ORDER — DULOXETIN HYDROCHLORIDE 20 MG/1
CAPSULE, DELAYED RELEASE ORAL
COMMUNITY
Start: 2025-06-27

## 2025-07-18 RX ORDER — CELECOXIB 200 MG/1
200 CAPSULE ORAL 2 TIMES DAILY PRN
COMMUNITY

## 2025-07-18 RX ORDER — GABAPENTIN 400 MG/1
CAPSULE ORAL
COMMUNITY
Start: 2025-06-09

## 2025-07-18 RX ORDER — PRAZOSIN HYDROCHLORIDE 2 MG/1
CAPSULE ORAL
COMMUNITY

## 2025-07-18 RX ORDER — SOLIFENACIN SUCCINATE 10 MG/1
10 TABLET, FILM COATED ORAL
COMMUNITY
Start: 2025-04-30

## 2025-07-18 RX ORDER — ALPRAZOLAM 0.25 MG/1
0.25 TABLET ORAL NIGHTLY
COMMUNITY
Start: 2025-06-06

## 2025-07-18 RX ORDER — FINASTERIDE 5 MG/1
5 TABLET, FILM COATED ORAL DAILY
Qty: 90 TABLET | Refills: 3 | Status: SHIPPED | OUTPATIENT
Start: 2025-07-18 | End: 2026-07-18

## 2025-07-18 RX ORDER — TADALAFIL 20 MG/1
20 TABLET ORAL DAILY PRN
COMMUNITY
Start: 2025-06-19

## 2025-07-18 NOTE — PROGRESS NOTES
Subjective:       Patient ID: Dre Fowler is a 60 y.o. male.    Chief Complaint: No chief complaint on file.      HPI: 59-year-old male, established patient, presents for six-month follow up.    Patient has history of BPH with obstruction.  Patient is maintained on Flomax 0.4 mg daily and Proscar 5 mg daily.    Patient has history urgency.  He had success with VESIcare 10 mg then Myrbetriq daily until the last 3-4 months.  He was still experiencing urinary frequency and urgency with occasional urge incontinence and some mild postvoid dribbling.    At his last visit he was started on Myrbetriq 50 mg and has seen improvement in his symptoms as long as he does not over drink fluids.  This time patient states he is doing well.  Denies any pain or burning urination.  Denies any odor to the urine.  Denies any fever or body aches.  Denies any blood in urine.   Denies having strain to void.     Patient has history of an elevated PSA.  He has had a PSA as high as 13.    He had a negative biopsy in the past.    Last PSA on 06/16/2025 was 1.310.  With Proscar conversion PSA was 2.62         Past Medical History:   Past Medical History:   Diagnosis Date    Anxiety disorder     Bipolar 1 disorder     Depression     Diabetes mellitus     Elevated heart rate with elevated blood pressure and diagnosis of hypertension     GERD (gastroesophageal reflux disease)     Insomnia     Male erectile dysfunction, unspecified     Prostatitis     Sleep apnea, unspecified     Slipped disc in neck     RT - DR STEVENSON       Past Surgical Historical:   Past Surgical History:   Procedure Laterality Date    BACK SURGERY  03/01/2019    lens repair Left     for [place] and Implant    NECK SURGERY      UMBILICAL HERNIA REPAIR  11/01/2018    W/ MESH    VASECTOMY          Medications:   Medication List with Changes/Refills   New Medications    FINASTERIDE (PROSCAR) 5 MG TABLET    Take 1 tablet (5 mg total) by mouth once daily.    VIBEGRON 75 MG TAB     Take 1 tablet (75 mg total) by mouth once daily.   Current Medications    ALPRAZOLAM (XANAX) 0.25 MG TABLET    Take 0.25 mg by mouth every evening.    ARIPIPRAZOLE (ABILIFY) 30 MG TAB        BUSPIRONE (BUSPAR) 15 MG TABLET        CELECOXIB (CELEBREX) 200 MG CAPSULE    Take 200 mg by mouth 2 (two) times daily as needed for Pain.    CHOLECALCIFEROL, VITAMIN D3, 125 MCG (5,000 UNIT) CAPSULE    Take 5,000 Units by mouth once daily. take with food    CYCLOBENZAPRINE (FLEXERIL) 10 MG TABLET    Take 10 mg by mouth every 8 (eight) hours as needed. FOR MUSCLE SPASM    DULOXETINE (CYMBALTA) 20 MG CAPSULE    TAKE 1 CAPSULE BY MOUTH EVERY NIGHT AT BEDTIME (TAKE WITH 60MG DOSE TO EQUAL 80MG)    DULOXETINE (CYMBALTA) 60 MG CAPSULE    TAKE 1 CAPSULE BY MOUTH AT BEDTIME IN ADDITION TO 20 MG DOSE    FINASTERIDE (PROSCAR) 5 MG TABLET    Take 1 tablet (5 mg total) by mouth once daily.    GABAPENTIN (NEURONTIN) 400 MG CAPSULE    TAKE 1 CAPSULE BY MOUTH ONCE DAILY AT NIGHT AT BEDTIME    IPRATROPIUM (ATROVENT) 42 MCG (0.06 %) NASAL SPRAY    use 2 sprays in each nostril up to tid for congestion J31.9    METFORMIN (GLUCOPHAGE) 500 MG TABLET        METOPROLOL SUCCINATE (TOPROL-XL) 100 MG 24 HR TABLET    TAKE 1/2 TABLET BY MOUTH EVERY MORNING FOR 4 DAYS, THEN 1 TAB DAILY FOR 4 DAYS, THEN 1.5 TABS DAILY IF NOT ALREADY A LOT BETTER    MIRABEGRON (MYRBETRIQ) 50 MG TB24    Take 1 tablet (50 mg total) by mouth once daily.    PANTOPRAZOLE (PROTONIX) 40 MG TABLET    Take 40 mg by mouth once daily.    PRAZOSIN (MINIPRESS) 2 MG CAP    TAKE 1 CAPSULE BY MOUTH AT BEDTIME FOR NIGHTMARES    SERTRALINE (ZOLOFT) 50 MG TABLET    Take 50 mg by mouth every morning.    SOLIFENACIN (VESICARE) 10 MG TABLET    Take 10 mg by mouth.    TADALAFIL (CIALIS) 20 MG TAB    Take 20 mg by mouth daily as needed.    TAMSULOSIN (FLOMAX) 0.4 MG CAP    Take 1 capsule (0.4 mg total) by mouth once daily.    TRAZODONE (DESYREL) 50 MG TABLET    Take 50 mg by mouth nightly.    Discontinued Medications    DIAZEPAM (VALIUM) 2 MG TABLET        DULOXETINE (CYMBALTA) 30 MG CAPSULE    Take 30 mg by mouth 2 (two) times daily.    GABAPENTIN (NEURONTIN) 600 MG TABLET    Take 600 mg by mouth 3 (three) times daily.    OZEMPIC 0.25 MG OR 0.5 MG(2 MG/1.5 ML) PEN INJECTOR        PRAZOSIN (MINIPRESS) 1 MG CAP    TAKE 1 CAPSULE BY MOUTH AT BEDTIME FOR NIGHTMARES    TADALAFIL (CIALIS) 5 MG TABLET    TAKE 1 TABLET BY MOUTH ONCE DAILY AS NEEDED BEFORE SEXUAL ACTIVITY        Past Social History:   Social History     Socioeconomic History    Marital status: Single   Tobacco Use    Smoking status: Former     Current packs/day: 1.00     Average packs/day: 1 pack/day for 30.0 years (30.0 ttl pk-yrs)     Types: Cigarettes    Smokeless tobacco: Never   Substance and Sexual Activity    Alcohol use: Not Currently    Drug use: Never       Allergies: Review of patient's allergies indicates:  No Known Allergies     Family History:   Family History   Problem Relation Name Age of Onset    Diabetes Mother      Hypertension Mother      Lung cancer Brother          Review of Systems:  Review of Systems   Constitutional: Negative.    HENT: Negative.     Eyes: Negative.    Respiratory: Negative.     Cardiovascular: Negative.    Gastrointestinal: Negative.    Endocrine: Negative.    Genitourinary:  Positive for frequency and urgency.   Musculoskeletal: Negative.    Skin: Negative.    Allergic/Immunologic: Negative.    Neurological: Negative.    Hematological: Negative.    Psychiatric/Behavioral: Negative.         Physical Exam:  Physical Exam  Constitutional:       Appearance: Normal appearance.   Cardiovascular:      Rate and Rhythm: Normal rate.   Pulmonary:      Effort: Pulmonary effort is normal.   Abdominal:      General: Bowel sounds are normal.      Palpations: Abdomen is soft.   Genitourinary:     Comments: Deferred PREMA  Neurological:      Mental Status: He is alert and oriented to person, place, and time.   PVR 1  mL  Assessment/Plan:     BPH with obstruction/history elevated PSA:   Continue Flomax and finasteride as previously prescribed.  Patient will return to clinic in 6 months for PSA.    Overactive bladder:  Patient has tried VESIcare and Myrbetriq in the past.  Still experiencing urgency and frequency.  Prescription for Gemtesa sent to his pharmacy    Follow up in 6 months with a PSA.  Problem List Items Addressed This Visit    None

## 2025-07-28 DIAGNOSIS — R39.15 URINARY URGENCY: ICD-10-CM

## 2025-07-28 DIAGNOSIS — R35.0 URINARY FREQUENCY: ICD-10-CM

## 2025-07-28 DIAGNOSIS — N32.81 OAB (OVERACTIVE BLADDER): Primary | ICD-10-CM

## 2025-07-28 RX ORDER — FINASTERIDE 5 MG/1
5 TABLET, FILM COATED ORAL DAILY
Qty: 90 TABLET | Refills: 3 | Status: SHIPPED | OUTPATIENT
Start: 2025-07-28 | End: 2025-07-28 | Stop reason: SDUPTHER

## 2025-07-28 RX ORDER — VIBEGRON 75 MG/1
75 TABLET, FILM COATED ORAL DAILY
Qty: 7 TABLET | Refills: 0 | Status: CANCELLED | COMMUNITY
Start: 2025-07-28

## 2025-07-28 RX ORDER — FINASTERIDE 5 MG/1
5 TABLET, FILM COATED ORAL
Qty: 90 TABLET | Refills: 3 | Status: SHIPPED | OUTPATIENT
Start: 2025-07-28 | End: 2025-07-29

## 2025-07-29 DIAGNOSIS — N32.81 OAB (OVERACTIVE BLADDER): Primary | ICD-10-CM

## 2025-07-29 RX ORDER — FINASTERIDE 5 MG/1
5 TABLET, FILM COATED ORAL
Qty: 90 TABLET | Refills: 3 | Status: SHIPPED | OUTPATIENT
Start: 2025-07-29

## 2025-08-01 ENCOUNTER — PATIENT MESSAGE (OUTPATIENT)
Dept: UROLOGY | Facility: CLINIC | Age: 60
End: 2025-08-01
Payer: MEDICAID

## 2025-08-01 DIAGNOSIS — N13.8 BENIGN PROSTATIC HYPERPLASIA WITH URINARY OBSTRUCTION: Primary | ICD-10-CM

## 2025-08-01 DIAGNOSIS — N40.1 BENIGN PROSTATIC HYPERPLASIA WITH URINARY OBSTRUCTION: Primary | ICD-10-CM

## 2025-08-04 RX ORDER — FINASTERIDE 5 MG/1
5 TABLET, FILM COATED ORAL DAILY
Qty: 90 TABLET | Refills: 3 | Status: SHIPPED | OUTPATIENT
Start: 2025-08-04

## 2025-08-29 ENCOUNTER — TELEPHONE (OUTPATIENT)
Dept: UROLOGY | Facility: CLINIC | Age: 60
End: 2025-08-29
Payer: MEDICAID

## 2025-08-29 DIAGNOSIS — N32.81 OAB (OVERACTIVE BLADDER): ICD-10-CM
